# Patient Record
Sex: MALE | Race: WHITE | Employment: FULL TIME | URBAN - METROPOLITAN AREA
[De-identification: names, ages, dates, MRNs, and addresses within clinical notes are randomized per-mention and may not be internally consistent; named-entity substitution may affect disease eponyms.]

---

## 2017-08-19 ENCOUNTER — HOSPITAL ENCOUNTER (INPATIENT)
Age: 27
LOS: 2 days | Discharge: HOME | DRG: 638 | End: 2017-08-21
Attending: EMERGENCY MEDICINE | Admitting: INTERNAL MEDICINE
Payer: COMMERCIAL

## 2017-08-19 ENCOUNTER — APPOINTMENT (OUTPATIENT)
Dept: GENERAL RADIOLOGY | Age: 27
DRG: 638 | End: 2017-08-19
Attending: INTERNAL MEDICINE
Payer: COMMERCIAL

## 2017-08-19 DIAGNOSIS — N17.9 AKI (ACUTE KIDNEY INJURY) (HCC): ICD-10-CM

## 2017-08-19 DIAGNOSIS — E87.5 ACUTE HYPERKALEMIA: ICD-10-CM

## 2017-08-19 DIAGNOSIS — E10.10 DIABETIC KETOACIDOSIS WITHOUT COMA ASSOCIATED WITH TYPE 1 DIABETES MELLITUS (HCC): Primary | ICD-10-CM

## 2017-08-19 PROBLEM — E11.10 DKA (DIABETIC KETOACIDOSES): Status: ACTIVE | Noted: 2017-08-19

## 2017-08-19 LAB
ADMINISTERED INITIALS, ADMINIT: NORMAL
ALBUMIN SERPL-MCNC: 3.5 G/DL (ref 3.4–5)
ALBUMIN/GLOB SERPL: 0.9 {RATIO} (ref 0.8–1.7)
ALP SERPL-CCNC: 86 U/L (ref 45–117)
ALT SERPL-CCNC: 17 U/L (ref 16–61)
ANION GAP SERPL CALC-SCNC: 21 MMOL/L (ref 3–18)
ANION GAP SERPL CALC-SCNC: 23 MMOL/L (ref 3–18)
AST SERPL-CCNC: 16 U/L (ref 15–37)
BASOPHILS # BLD: 0 K/UL (ref 0–0.06)
BASOPHILS NFR BLD: 0 % (ref 0–2)
BILIRUB SERPL-MCNC: 1.1 MG/DL (ref 0.2–1)
BUN SERPL-MCNC: 31 MG/DL (ref 7–18)
BUN SERPL-MCNC: 32 MG/DL (ref 7–18)
BUN/CREAT SERPL: 16 (ref 12–20)
BUN/CREAT SERPL: 17 (ref 12–20)
CALCIUM SERPL-MCNC: 8.5 MG/DL (ref 8.5–10.1)
CALCIUM SERPL-MCNC: 9.6 MG/DL (ref 8.5–10.1)
CHLORIDE SERPL-SCNC: 91 MMOL/L (ref 100–108)
CHLORIDE SERPL-SCNC: 99 MMOL/L (ref 100–108)
CO2 SERPL-SCNC: 16 MMOL/L (ref 21–32)
CO2 SERPL-SCNC: 17 MMOL/L (ref 21–32)
CREAT SERPL-MCNC: 1.83 MG/DL (ref 0.6–1.3)
CREAT SERPL-MCNC: 2.01 MG/DL (ref 0.6–1.3)
D50 ADMINISTERED, D50ADM: 0 ML
D50 ORDER, D50ORD: 0 ML
DIFFERENTIAL METHOD BLD: ABNORMAL
EOSINOPHIL # BLD: 0 K/UL (ref 0–0.4)
EOSINOPHIL NFR BLD: 0 % (ref 0–5)
ERYTHROCYTE [DISTWIDTH] IN BLOOD BY AUTOMATED COUNT: 11.8 % (ref 11.6–14.5)
EST. AVERAGE GLUCOSE BLD GHB EST-MCNC: 206 MG/DL
GLOBULIN SER CALC-MCNC: 4 G/DL (ref 2–4)
GLUCOSE BLD STRIP.AUTO-MCNC: 345 MG/DL (ref 70–110)
GLUCOSE BLD STRIP.AUTO-MCNC: 439 MG/DL (ref 70–110)
GLUCOSE BLD STRIP.AUTO-MCNC: 519 MG/DL (ref 70–110)
GLUCOSE BLD STRIP.AUTO-MCNC: >600 MG/DL (ref 70–110)
GLUCOSE SERPL-MCNC: 466 MG/DL (ref 74–99)
GLUCOSE SERPL-MCNC: 707 MG/DL (ref 74–99)
GLUCOSE, GLC: 345 MG/DL
GLUCOSE, GLC: 439 MG/DL
GLUCOSE, GLC: 519 MG/DL
GLUCOSE, GLC: 707 MG/DL
HBA1C MFR BLD: 8.8 % (ref 4.5–5.6)
HCT VFR BLD AUTO: 44.6 % (ref 36–48)
HGB BLD-MCNC: 15.7 G/DL (ref 13–16)
HIGH TARGET, HITG: 180 MG/DL
INSULIN ADMINSTERED, INSADM: 12.9 UNITS/HOUR
INSULIN ADMINSTERED, INSADM: 2.9 UNITS/HOUR
INSULIN ADMINSTERED, INSADM: 4.6 UNITS/HOUR
INSULIN ADMINSTERED, INSADM: 7.6 UNITS/HOUR
INSULIN ORDER, INSORD: 12.9 UNITS/HOUR
INSULIN ORDER, INSORD: 2.9 UNITS/HOUR
INSULIN ORDER, INSORD: 4.6 UNITS/HOUR
INSULIN ORDER, INSORD: 7.6 UNITS/HOUR
LOW TARGET, LOT: 140 MG/DL
LYMPHOCYTES # BLD: 1.2 K/UL (ref 0.9–3.6)
LYMPHOCYTES NFR BLD: 11 % (ref 21–52)
MAGNESIUM SERPL-MCNC: 2.3 MG/DL (ref 1.6–2.6)
MCH RBC QN AUTO: 31.4 PG (ref 24–34)
MCHC RBC AUTO-ENTMCNC: 35.2 G/DL (ref 31–37)
MCV RBC AUTO: 89.2 FL (ref 74–97)
MINUTES UNTIL NEXT BG, NBG: 60 MIN
MONOCYTES # BLD: 0.5 K/UL (ref 0.05–1.2)
MONOCYTES NFR BLD: 5 % (ref 3–10)
MULTIPLIER, MUL: 0.01
MULTIPLIER, MUL: 0.01
MULTIPLIER, MUL: 0.02
MULTIPLIER, MUL: 0.02
NEUTS SEG # BLD: 9 K/UL (ref 1.8–8)
NEUTS SEG NFR BLD: 84 % (ref 40–73)
ORDER INITIALS, ORDINIT: NORMAL
PLATELET # BLD AUTO: 339 K/UL (ref 135–420)
PMV BLD AUTO: 11.1 FL (ref 9.2–11.8)
POTASSIUM SERPL-SCNC: 5.2 MMOL/L (ref 3.5–5.5)
POTASSIUM SERPL-SCNC: 6.5 MMOL/L (ref 3.5–5.5)
PROT SERPL-MCNC: 7.5 G/DL (ref 6.4–8.2)
RBC # BLD AUTO: 5 M/UL (ref 4.7–5.5)
RBC MORPH BLD: ABNORMAL
SODIUM SERPL-SCNC: 131 MMOL/L (ref 136–145)
SODIUM SERPL-SCNC: 136 MMOL/L (ref 136–145)
WBC # BLD AUTO: 10.7 K/UL (ref 4.6–13.2)

## 2017-08-19 PROCEDURE — 74011250636 HC RX REV CODE- 250/636: Performed by: PHYSICIAN ASSISTANT

## 2017-08-19 PROCEDURE — 82962 GLUCOSE BLOOD TEST: CPT

## 2017-08-19 PROCEDURE — 74011250636 HC RX REV CODE- 250/636: Performed by: INTERNAL MEDICINE

## 2017-08-19 PROCEDURE — 87040 BLOOD CULTURE FOR BACTERIA: CPT | Performed by: INTERNAL MEDICINE

## 2017-08-19 PROCEDURE — 80053 COMPREHEN METABOLIC PANEL: CPT | Performed by: INTERNAL MEDICINE

## 2017-08-19 PROCEDURE — 74011000258 HC RX REV CODE- 258: Performed by: PHYSICIAN ASSISTANT

## 2017-08-19 PROCEDURE — 83036 HEMOGLOBIN GLYCOSYLATED A1C: CPT | Performed by: PHYSICIAN ASSISTANT

## 2017-08-19 PROCEDURE — 93005 ELECTROCARDIOGRAM TRACING: CPT

## 2017-08-19 PROCEDURE — 96365 THER/PROPH/DIAG IV INF INIT: CPT

## 2017-08-19 PROCEDURE — 96361 HYDRATE IV INFUSION ADD-ON: CPT

## 2017-08-19 PROCEDURE — 80048 BASIC METABOLIC PNL TOTAL CA: CPT | Performed by: PHYSICIAN ASSISTANT

## 2017-08-19 PROCEDURE — 96375 TX/PRO/DX INJ NEW DRUG ADDON: CPT

## 2017-08-19 PROCEDURE — 99285 EMERGENCY DEPT VISIT HI MDM: CPT

## 2017-08-19 PROCEDURE — 74011000258 HC RX REV CODE- 258: Performed by: INTERNAL MEDICINE

## 2017-08-19 PROCEDURE — 83735 ASSAY OF MAGNESIUM: CPT | Performed by: PHYSICIAN ASSISTANT

## 2017-08-19 PROCEDURE — 71010 XR CHEST PORT: CPT

## 2017-08-19 PROCEDURE — 85025 COMPLETE CBC W/AUTO DIFF WBC: CPT | Performed by: PHYSICIAN ASSISTANT

## 2017-08-19 PROCEDURE — 36415 COLL VENOUS BLD VENIPUNCTURE: CPT | Performed by: INTERNAL MEDICINE

## 2017-08-19 PROCEDURE — 74011636637 HC RX REV CODE- 636/637: Performed by: PHYSICIAN ASSISTANT

## 2017-08-19 PROCEDURE — 65610000006 HC RM INTENSIVE CARE

## 2017-08-19 RX ORDER — MAGNESIUM SULFATE 100 %
4 CRYSTALS MISCELLANEOUS AS NEEDED
Status: DISCONTINUED | OUTPATIENT
Start: 2017-08-19 | End: 2017-08-19 | Stop reason: SDUPTHER

## 2017-08-19 RX ORDER — HEPARIN SODIUM 5000 [USP'U]/ML
5000 INJECTION, SOLUTION INTRAVENOUS; SUBCUTANEOUS EVERY 8 HOURS
Status: DISCONTINUED | OUTPATIENT
Start: 2017-08-19 | End: 2017-08-21 | Stop reason: HOSPADM

## 2017-08-19 RX ORDER — ACETAMINOPHEN 325 MG/1
650 TABLET ORAL
Status: DISCONTINUED | OUTPATIENT
Start: 2017-08-19 | End: 2017-08-21 | Stop reason: HOSPADM

## 2017-08-19 RX ORDER — MAGNESIUM SULFATE 100 %
4 CRYSTALS MISCELLANEOUS AS NEEDED
Status: DISCONTINUED | OUTPATIENT
Start: 2017-08-19 | End: 2017-08-21 | Stop reason: HOSPADM

## 2017-08-19 RX ORDER — ONDANSETRON 2 MG/ML
4 INJECTION INTRAMUSCULAR; INTRAVENOUS
Status: COMPLETED | OUTPATIENT
Start: 2017-08-19 | End: 2017-08-19

## 2017-08-19 RX ORDER — HYDROCODONE BITARTRATE AND ACETAMINOPHEN 5; 325 MG/1; MG/1
1 TABLET ORAL
Status: DISCONTINUED | OUTPATIENT
Start: 2017-08-19 | End: 2017-08-21 | Stop reason: HOSPADM

## 2017-08-19 RX ORDER — SODIUM CHLORIDE 9 MG/ML
200 INJECTION, SOLUTION INTRAVENOUS CONTINUOUS
Status: DISCONTINUED | OUTPATIENT
Start: 2017-08-19 | End: 2017-08-20

## 2017-08-19 RX ORDER — DEXTROSE 50 % IN WATER (D50W) INTRAVENOUS SYRINGE
25-50 AS NEEDED
Status: DISCONTINUED | OUTPATIENT
Start: 2017-08-19 | End: 2017-08-21 | Stop reason: HOSPADM

## 2017-08-19 RX ORDER — DEXTROSE 50 % IN WATER (D50W) INTRAVENOUS SYRINGE
25-50 AS NEEDED
Status: DISCONTINUED | OUTPATIENT
Start: 2017-08-19 | End: 2017-08-19 | Stop reason: SDUPTHER

## 2017-08-19 RX ORDER — BUPROPION HYDROCHLORIDE 100 MG/1
TABLET ORAL 2 TIMES DAILY
COMMUNITY

## 2017-08-19 RX ORDER — ENALAPRIL MALEATE 10 MG/1
TABLET ORAL DAILY
COMMUNITY

## 2017-08-19 RX ADMIN — SODIUM CHLORIDE 1000 ML: 900 INJECTION, SOLUTION INTRAVENOUS at 17:57

## 2017-08-19 RX ADMIN — SODIUM CHLORIDE 12.9 UNITS/HR: 900 INJECTION, SOLUTION INTRAVENOUS at 19:49

## 2017-08-19 RX ADMIN — PROMETHAZINE HYDROCHLORIDE 25 MG: 25 INJECTION, SOLUTION INTRAMUSCULAR; INTRAVENOUS at 20:18

## 2017-08-19 RX ADMIN — HEPARIN SODIUM 5000 UNITS: 5000 INJECTION, SOLUTION INTRAVENOUS; SUBCUTANEOUS at 22:20

## 2017-08-19 RX ADMIN — CEFTRIAXONE 1 G: 1 INJECTION, POWDER, FOR SOLUTION INTRAMUSCULAR; INTRAVENOUS at 22:19

## 2017-08-19 RX ADMIN — SODIUM CHLORIDE 125 ML/HR: 900 INJECTION, SOLUTION INTRAVENOUS at 22:19

## 2017-08-19 RX ADMIN — ONDANSETRON 4 MG: 2 INJECTION INTRAMUSCULAR; INTRAVENOUS at 18:00

## 2017-08-19 RX ADMIN — SODIUM CHLORIDE 1000 ML: 900 INJECTION, SOLUTION INTRAVENOUS at 17:56

## 2017-08-19 RX ADMIN — SODIUM CHLORIDE 4.6 UNITS/HR: 900 INJECTION, SOLUTION INTRAVENOUS at 21:02

## 2017-08-19 NOTE — Clinical Note
Status[de-identified] Inpatient [101] Type of Bed: Intensive Care [6] Inpatient Hospitalization Certified Necessary for the Following Reasons: 4. Patient requires ICU level of care interventions (further clarification in H&P documentation) Admitting Diagnosis: DKA (diabetic ketoacidoses) ([facility] [469644] Admitting Physician: [id number] [place] [address] Attending Physician: Leroy Serrano [id number] Estimated Length of Stay: 3-4 Midnights Discharge Plan[de-identified] Home with Office Follow-up

## 2017-08-19 NOTE — ED NOTES
Pt report given to Lenwood Nissen, RN. SBAR, ED summary, MAR and recent results reviewed. Pt resting in stretcher with side rails raised and call light within reach.

## 2017-08-19 NOTE — ED PROVIDER NOTES
[address]  EMERGENCY DEPARTMENT HISTORY AND PHYSICAL EXAM       Date: 8/19/2017   Patient Name: Sarah Gomez   YOB: 1990  Medical Record Number: [id number]    History of Presenting Illness     Chief Complaint   Patient presents with    High Blood Sugar    Nausea        History Provided By:  patient    Additional History: 5:26 PM  Sarah Gomez is a 31 y.o. male with PMHx of IDDM presenting to the ED C/O nausea and high blood pressure onset this morning. Associated sxs include vomiting (1 episode in ED) and fatigue. Pt reports he felt dehydrated this morning with a b/p of 221 and after drinking water without relief he visited [address] He states his b/p then elevated prompting visit to ED. Pt reports having strep throat last week and has been taking Amoxicillin. States his b/p was \"good\" until he got strep throat. Pt states during his \"normal day\" he measures his b/p at 11 PM, 3 AM and ~7 AM. States he eats dinner at 8 AM and then goes to sleep. Last insulin dose of 8 units prior to visit to [address] . Pt denies cough, generalized pain and any other symptoms or complaints at this time. Primary Care Provider: Maryjane Valdovinos MD   Specialist:    Past History     Past Medical History:   Past Medical History:   Diagnosis Date    Diabetes [facility])         Past Surgical History:   History reviewed. No pertinent surgical history. Family History:   History reviewed. No pertinent family history. Social History:   Social History   Substance Use Topics    Smoking status: Never Smoker    Smokeless tobacco: None    Alcohol use No        Allergies:   No Known Allergies     Review of Systems   Review of Systems   Constitutional: Positive for fatigue. Negative for chills and fever. Respiratory: Negative for cough. Gastrointestinal: Positive for nausea and vomiting. All other systems reviewed and are negative.       Physical Exam  Vitals:    08/19/17 1915 08/19/17 2030 08/19/17 2100 08/19/17 2115   BP:  108/46  116/48   Pulse: (!) 115 (!) 121 (!) 110 (!) 111   Resp: 11 14 18 17   Temp:    98.7 °F (37.1 °C)   SpO2: 100% 99% 100% 100%   Weight:       Height:           Physical Exam  Vital signs and nursing notes reviewed. CONSTITUTIONAL: Alert. Nontoxic-appearing; well-nourished; in no apparent distress. HEAD: Normocephalic; atraumatic. EYES: PERRL; Conjunctiva clear. ENT: TM's normal. External ear normal. Normal nose; no rhinorrhea. Normal pharynx. Moist mucus membranes. NECK: Supple; FROM without difficulty, non-tender; no cervical lymphadenopathy. CV: Tachycardic, regular S1, S2; no murmurs, rubs, or gallops. No chest wall tenderness. RESPIRATORY: Normal chest excursion with respiration; breath sounds clear and equal bilaterally; no wheezes, rhonchi, or rales. GI: Normal bowel sounds; non-distended; non-tender; No CVA tenderness. EXT: Normal ROM in all four extremities; non-tender to palpation. SKIN: Normal for age and race; warm; dry; good turgor; no apparent lesions or exudate. NEURO: A & O x3. Cranial nerves 2-12 intact. Motor 5/5 bilaterally. Sensation intact. PSYCH:  Mood and affect appropriate. Diagnostic Study Results     Labs -      Recent Results (from the past 12 hour(s))   CBC WITH AUTOMATED DIFF    Collection Time: 08/19/17  5:35 PM   Result Value Ref Range    WBC 10.7 4.6 - 13.2 K/uL    RBC 5.00 4.70 - 5.50 M/uL    HGB 15.7 13.0 - 16.0 g/dL    HCT 44.6 36.0 - 48.0 %    MCV 89.2 74.0 - 97.0 FL    MCH 31.4 24.0 - 34.0 PG    MCHC 35.2 31.0 - 37.0 g/dL    RDW 11.8 11.6 - 14.5 %    PLATELET 129 248 - 701 K/uL    MPV 11.1 9.2 - 11.8 FL    NEUTROPHILS 84 (H) 40 - 73 %    LYMPHOCYTES 11 (L) 21 - 52 %    MONOCYTES 5 3 - 10 %    EOSINOPHILS 0 0 - 5 %    BASOPHILS 0 0 - 2 %    ABS. NEUTROPHILS 9.0 (H) 1.8 - 8.0 K/UL    ABS. LYMPHOCYTES 1.2 0.9 - 3.6 K/UL    ABS. MONOCYTES 0.5 0.05 - 1.2 K/UL    ABS. EOSINOPHILS 0.0 0.0 - 0.4 K/UL    ABS. BASOPHILS 0.0 0.0 - 0.06 K/UL    RBC COMMENTS NORMOCYTIC, NORMOCHROMIC      DF AUTOMATED     METABOLIC PANEL, BASIC    Collection Time: 08/19/17  5:35 PM   Result Value Ref Range    Sodium 131 (L) 136 - 145 mmol/L    Potassium 6.5 (HH) 3.5 - 5.5 mmol/L    Chloride 91 (L) 100 - 108 mmol/L    CO2 17 (L) 21 - 32 mmol/L    Anion gap 23 (H) 3.0 - 18 mmol/L    Glucose 707 (HH) 74 - 99 mg/dL    BUN 32 (H) 7.0 - 18 MG/DL    Creatinine 2.01 (H) 0.6 - 1.3 MG/DL    BUN/Creatinine ratio 16 12 - 20      GFR est AA 49 (L) >60 ml/min/1.73m2    GFR est non-AA 40 (L) >60 ml/min/1.73m2    Calcium 9.6 8.5 - 10.1 MG/DL   MAGNESIUM    Collection Time: 08/19/17  5:35 PM   Result Value Ref Range    Magnesium 2.3 1.6 - 2.6 mg/dL   HEMOGLOBIN A1C WITH EAG    Collection Time: 08/19/17  5:35 PM   Result Value Ref Range    Hemoglobin A1c 8.8 (H) 4.5 - 5.6 %    Est. average glucose 206 mg/dL   GLUCOSTABILIZER    Collection Time: 08/19/17  7:46 PM   Result Value Ref Range    Glucose 707 mg/dL    Insulin order 12.9 units/hour    Insulin adminstered 12.9 units/hour    Multiplier 0.020     Low target 140 mg/dL    High target 180 mg/dL    D50 order 0.0 ml    D50 administered 0.00 ml    Minutes until next BG 60 min    Order initials MPV     Administered initials MPV    EKG, 12 LEAD, INITIAL    Collection Time: 08/19/17  8:00 PM   Result Value Ref Range    Ventricular Rate 116 BPM    Atrial Rate 116 BPM    P-R Interval 128 ms    QRS Duration 88 ms    Q-T Interval 316 ms    QTC Calculation (Bezet) 439 ms    Calculated P Axis 55 degrees    Calculated R Axis 86 degrees    Calculated T Axis 0 degrees    Diagnosis       Sinus tachycardia  Possible Left atrial enlargement  Borderline ECG  When compared with ECG of 15-JUL-2011 17:44,  No significant change was found     GLUCOSE, POC    Collection Time: 08/19/17  8:03 PM   Result Value Ref Range    Glucose (POC) >600 (HH) 70 - 110 mg/dL   GLUCOSE, POC    Collection Time: 08/19/17  8:53 PM   Result Value Ref Range    Glucose (POC) 530 (HH) 70 - 110 mg/dL   GLUCOSE, POC    Collection Time: 08/19/17  8:55 PM   Result Value Ref Range    Glucose (POC) 519 (HH) 70 - 110 mg/dL   GLUCOSTABILIZER    Collection Time: 08/19/17  9:00 PM   Result Value Ref Range    Glucose 519 mg/dL    Insulin order 4.6 units/hour    Insulin adminstered 4.6 units/hour    Multiplier 0.010     Low target 140 mg/dL    High target 180 mg/dL    D50 order 0.0 ml    D50 administered 0.00 ml    Minutes until next BG 60 min    Order initials MPV     Administered initials MPV        Radiologic Studies -  The following have been ordered and reviewed:   XR CHEST PORT    (Results Pending)       Medical Decision Making   I am the first provider for this patient. I reviewed the vital signs, available nursing notes, past medical history, past surgical history, family history and social history. Vital Signs-Reviewed the patient's vital signs. Patient Vitals for the past 12 hrs:   Temp Pulse Resp BP SpO2   08/19/17 2115 98.7 °F (37.1 °C) (!) 111 17 116/48 100 %   08/19/17 2100 - (!) 110 18 - 100 %   08/19/17 2030 - (!) 121 14 108/46 99 %   08/19/17 1915 - (!) 115 11 - 100 %   08/19/17 1900 - (!) 118 18 134/46 100 %   08/19/17 1845 - (!) 119 19 123/53 100 %   08/19/17 1830 - (!) 114 20 (!) 121/36 99 %   08/19/17 1815 - (!) 108 17 135/53 100 %   08/19/17 1800 - (!) 106 13 126/58 100 %   08/19/17 1745 - (!) 107 14 139/54 99 %   08/19/17 1730 - (!) 111 13 141/66 100 %   08/19/17 1724 98 °F (36.7 °C) (!) 107 16 136/59 100 %       Pulse Oximetry Analysis - Normal 100% on RA. Cardiac Monitor:   Rate: 118 bpm  Rhythm: Sinus Tachycardia     EKG interpretation: (Preliminary)  Rhythm: Sinus tachycardia. Rate (approx.): 116bpm; Possible left atrial enlargement   EKG read by Charlott Gitelman, PA-C at 8:00 PM    Old Medical Records: Nursing notes. Procedures:   Procedures      ED Course:  5:26 PM  Initial assessment performed.  The patients presenting problems have been discussed, and they are in agreement with the care plan formulated and outlined with them. I have encouraged them to ask questions as they arise throughout their visit. PROGRESS NOTE:   7:44 PM  Pt has been re-examined by RYAN Villasenor. Still nauseated but no longer vomiting and feeling better after 2L IV NS. Repeat FSBS after IVFs still \"high. \" Updated on results, dx of DKA and RENETTA requiring discontinuing his insulin pump, start glucostabilizer and admission to the ICU. He is tolerating PO fluids at this time. Written by RYAN Villasenor .    CONSULT NOTE:   7:45 PM  RYAN Villasenor spoke with Dr. Vinny Davis. Specialty: ED attending  Discussed pt's hx, disposition, and available diagnostic and imaging results. Reviewed care plans. Consulting physician agrees with plans as outlined, glucostabilizer and consulting hospitalist for admission. Written by RYAN Villasenor.    8:06 PM Discussed patient's history, exam, and available diagnostics results with Abisai Gutierrez MD, Hospitalist, who agrees to admit ICU. Medications Given in the ED:  Medications   insulin regular (NOVOLIN R, HUMULIN R) 100 Units in 0.9% sodium chloride 100 mL infusion (4.6 Units/hr IntraVENous New Bag 8/19/17 2102)   glucose chewable tablet 16 g (not administered)   glucagon (GLUCAGEN) injection 1 mg (not administered)   dextrose (D50W) injection syrg 12.5-25 g (not administered)   sodium chloride 0.9 % bolus infusion 1,000 mL (0 mL IntraVENous IV Completed 8/19/17 1951)   sodium chloride 0.9 % bolus infusion 1,000 mL (0 mL IntraVENous IV Completed 8/19/17 1951)   ondansetron (ZOFRAN) injection 4 mg (4 mg IntraVENous Given 8/19/17 1800)   promethazine (PHENERGAN) 25 mg in 0.9% sodium chloride 50 mL IVPB (25 mg IntraVENous New Bag 8/19/17 2018)     ADMISSION NOTE:  8:09 PM  Patient is being admitted to the hospital by Merline Sims.  Obey Gutierrez MD. The results of their tests and reasons for their admission have been discussed with them and/or available family. They convey agreement and understanding for the need to be admitted and for their admission diagnosis. Written by Yumi Nascimento ED Scribe, as dictated by Yohan Reid PA-C.    CONDITIONS ON ADMISSION:  Deep Vein Thrombosis is not present at the time of admission. Thrombosis is not present at the time of admission. Urinary Tract Infection is not present at the time of admission. Pneumonia is not present at the time of admission. MRSA is not present at the time of admission. Wound infection is not present at the time of admission. Pressure Ulcer is not present at the time of admission. Diagnosis   Clinical Impression:   1. Diabetic ketoacidosis without coma associated with type 1 diabetes mellitus ([facility]    2. RENETTA (acute kidney injury) ([facility]    3. Acute hyperkalemia       Critical Care Time:   I have spent 40 minutes of critical care time involved in lab review, consultations with specialist, family decision-making, and documentation. During this entire length of time I was immediately available to the patient. Critical Care: The reason for providing this level of medical care for this critically ill patient was due a critical illness that impaired one or more vital organ systems such that there was a high probability of imminent or life threatening deterioration in the patients condition. This care involved high complexity decision making to assess, manipulate, and support vital system functions, to treat this degreee vital organ system failure and to prevent further life threatening deterioration of the patients condition. _______________________________   Attestations: This note is prepared by Yumi Nascimento, acting as a Scribe for Yohan Reid PA-C on 5:21 PM on 8/19/2017 .     Yohan Reid PA-C: The scribe's documentation has been prepared under my direction and personally reviewed by me in its entirety.   _______________________________

## 2017-08-19 NOTE — ED NOTES
Pt resting in stretcher on CCM. IV fluids infusing. Pt medicated for nausea per MAR. Emesis bag given to pt. Large amount of emesis noted in basis. Pt denies vomiting before coming to ED. Family at bedside. Side rails raised x 2 and call light within reach.

## 2017-08-19 NOTE — IP AVS SNAPSHOT
Miah Zamudio 
 
 
 [address] [address] 
[phone] Patient: Patricia Steele MRN: [id number] :1990 You are allergic to the following No active allergies Recent Documentation Height Weight BMI Smoking Status 1.753 m 81.6 kg 26.58 kg/m2 Never Smoker Unresulted Labs Order Current Status T3 TOTAL In process CULTURE, BLOOD Preliminary result Emergency Contacts Name Discharge Info Relation Home Work Mobile Kecia Adjutant C  Parent [1] [phone] About your hospitalization You were admitted on:  2017 You last received care in the:  [facility] [address] You were discharged on:  2017 Unit phone number:  [phone] Why you were hospitalized Your primary diagnosis was:  Dka (Diabetic Ketoacidoses) (Hcc) Your diagnoses also included:  Dehydration, Iddm (Insulin Dependent Diabetes Mellitus) (Hcc), Lisandro (Acute Kidney Injury) (Hcc) Providers Seen During Your Hospitalizations Provider Role Specialty Primary office phone Charlie Guaman MD Attending Provider Emergency Medicine [phone] Pj Sanchez MD Attending Provider Emergency Medicine [phone] Matt Littlejohn MD Attending Provider Internal Medicine [phone] Your Primary Care Physician (PCP) Primary Care Physician Office Phone Office Fax [facility] [phone] Follow-up Information Follow up With Details Comments Contact Info King Minaya MD On 2017 Follow-up appointment @ 10:00 a.m. [address] [facility] [address] [place] [address] 
[phone] Current Discharge Medication List  
  
CONTINUE these medications which have NOT CHANGED Dose & Instructions Dispensing Information Comments Morning Noon Evening Bedtime  
 enalapril 10 mg tablet Commonly known as:  Joshua Salinas  
   
 Your last dose was: Your next dose is: Take  by mouth daily. Refills:  0 HumaLOG 100 unit/mL cartridge Generic drug:  insulin lispro Your last dose was: Your next dose is:    
   
   
 by SubCUTAneous route. Via insulin pump Refills:  0 WELLBUTRIN 100 mg tablet Generic drug:  buPROPion Your last dose was: Your next dose is: Take  by mouth two (2) times a day. Refills:  0 STOP taking these medications   
 insulin glargine 100 unit/mL injection Commonly known as:  LANTUS Discharge Instructions Learning About Diabetes Food Guidelines Your Care Instructions Meal planning is important to manage diabetes. It helps keep your blood sugar at a target level (which you set with your doctor). You don't have to eat special foods. You can eat what your family eats, including sweets once in a while. But you do have to pay attention to how often you eat and how much you eat of certain foods. You may want to work with a dietitian or a certified diabetes educator (CDE) to help you plan meals and snacks. A dietitian or CDE can also help you lose weight if that is one of your goals. What should you know about eating carbs? Managing the amount of carbohydrate (carbs) you eat is an important part of healthy meals when you have diabetes. Carbohydrate is found in many foods. · Learn which foods have carbs. And learn the amounts of carbs in different foods. ¨ Bread, cereal, pasta, and rice have about 15 grams of carbs in a serving. A serving is 1 slice of bread (1 ounce), ½ cup of cooked cereal, or 1/3 cup of cooked pasta or rice. ¨ Fruits have 15 grams of carbs in a serving.  A serving is 1 small fresh fruit, such as an apple or orange; ½ of a banana; ½ cup of cooked or canned fruit; ½ cup of fruit juice; 1 cup of melon or raspberries; or 2 tablespoons of dried fruit. ¨ Milk and no-sugar-added yogurt have 15 grams of carbs in a serving. A serving is 1 cup of milk or 2/3 cup of no-sugar-added yogurt. ¨ Starchy vegetables have 15 grams of carbs in a serving. A serving is ½ cup of mashed potatoes or sweet potato; 1 cup winter squash; ½ of a small baked potato; ½ cup of cooked beans; or ½ cup cooked corn or green peas. · Learn how much carbs to eat each day and at each meal. A dietitian or CDE can teach you how to keep track of the amount of carbs you eat. This is called carbohydrate counting. · If you are not sure how to count carbohydrate grams, use the Plate Method to plan meals. It is a good, quick way to make sure that you have a balanced meal. It also helps you spread carbs throughout the day. ¨ Divide your plate by types of foods. Put non-starchy vegetables on half the plate, meat or other protein food on one-quarter of the plate, and a grain or starchy vegetable in the final quarter of the plate. To this you can add a small piece of fruit and 1 cup of milk or yogurt, depending on how many carbs you are supposed to eat at a meal. 
· Try to eat about the same amount of carbs at each meal. Do not \"save up\" your daily allowance of carbs to eat at one meal. 
· Proteins have very little or no carbs per serving. Examples of proteins are beef, chicken, turkey, fish, eggs, tofu, cheese, cottage cheese, and peanut butter. A serving size of meat is 3 ounces, which is about the size of a deck of cards. Examples of meat substitute serving sizes (equal to 1 ounce of meat) are 1/4 cup of cottage cheese, 1 egg, 1 tablespoon of peanut butter, and ½ cup of tofu. How can you eat out and still eat healthy? · Learn to estimate the serving sizes of foods that have carbohydrate. If you measure food at home, it will be easier to estimate the amount in a serving of restaurant food.  
· If the meal you order has too much carbohydrate (such as potatoes, corn, or baked beans), ask to have a low-carbohydrate food instead. Ask for a salad or green vegetables. · If you use insulin, check your blood sugar before and after eating out to help you plan how much to eat in the future. · If you eat more carbohydrate at a meal than you had planned, take a walk or do other exercise. This will help lower your blood sugar. What else should you know? · Limit saturated fat, such as the fat from meat and dairy products. This is a healthy choice because people who have diabetes are at higher risk of heart disease. So choose lean cuts of meat and nonfat or low-fat dairy products. Use olive or canola oil instead of butter or shortening when cooking. · Don't skip meals. Your blood sugar may drop too low if you skip meals and take insulin or certain medicines for diabetes. · Check with your doctor before you drink alcohol. Alcohol can cause your blood sugar to drop too low. Alcohol can also cause a bad reaction if you take certain diabetes medicines. Follow-up care is a key part of your treatment and safety. Be sure to make and go to all appointments, and call your doctor if you are having problems. It's also a good idea to know your test results and keep a list of the medicines you take. Where can you learn more? Go to http://ronald-valentina.info/. Enter C045 in the search box to learn more about \"Learning About Diabetes Food Guidelines. \" Current as of: March 13, 2017 Content Version: 11.3 © 2878-5626 [employer]. Care instructions adapted under license by [employer] (which disclaims liability or warranty for this information). If you have questions about a medical condition or this instruction, always ask your healthcare professional. [place] [address] any warranty or liability for your use of this information. Nutrition Tips for Diabetes: After Your Visit Your Care Instructions A healthy diet is important to manage diabetes. It helps you lose weight (if you need to) and keep it off. It gives you the nutrition and energy your body needs and helps prevent heart disease. But a diet for diabetes does not mean that you have to eat special foods. You can eat what your family eats, including occasional sweets and other favorites. But you do have to pay attention to how often you eat and how much you eat of certain foods. The right plan for you will give you meals that help you keep your blood sugar at healthy levels. Try to eat a variety of foods and to spread carbohydrate throughout the day. Carbohydrate raises blood sugar higher and more quickly than any other nutrient does. Carbohydrate is found in sugar, breads and cereals, fruit, starchy vegetables such as potatoes and corn, and milk and yogurt. You may want to work with a dietitian or diabetes educator to help you plan meals and snacks. A dietitian or diabetes educator also can help you lose weight if that is one of your goals. The following tips can help you enjoy your meals and stay healthy. Follow-up care is a key part of your treatment and safety. Be sure to make and go to all appointments, and call your doctor if you are having problems. Its also a good idea to know your test results and keep a list of the medicines you take. How can you care for yourself at home? · Learn which foods have carbohydrate and how much carbohydrate to eat. A dietitian or diabetes educator can help you learn to keep track of how much carbohydrate you eat. · Spread carbohydrate throughout the day. Eat some carbohydrate at all meals, but do not eat too much at any one time. · Plan meals to include food from all the food groups. These are the food groups and some example portion sizes: ¨ Grains: 1 slice of bread (1 ounce), ½ cup of cooked cereal, and 1/3 cup of cooked pasta or rice.  These have about 15 grams of carbohydrate in a serving. Choose whole grains such as whole wheat bread or crackers, oatmeal, and brown rice more often than refined grains. ¨ Fruit: 1 small fresh fruit, such as an apple or orange; ½ of a banana; ½ cup of chopped, cooked, or canned fruit; ½ cup of fruit juice; 1 cup of melon or raspberries; and 2 tablespoons of dried fruit. These have about 15 grams of carbohydrate in a serving. ¨ Dairy: 1 cup of nonfat or low-fat milk and 2/3 cup of plain yogurt. These have about 15 grams of carbohydrate in a serving. ¨ Protein foods: Beef, chicken, turkey, fish, eggs, tofu, cheese, cottage cheese, and peanut butter. A serving size of meat is 3 ounces, which is about the size of a deck of cards. Examples of meat substitute serving sizes (equal to 1 ounce of meat) are 1/4 cup of cottage cheese, 1 egg, 1 tablespoon of peanut butter, and ½ cup of tofu. These have very little or no carbohydrate per serving. ¨ Vegetables: Starchy vegetables such as ½ cup of cooked dried beans, peas, potatoes, or corn have about 15 grams of carbohydrate. Nonstarchy vegetables have very little carbohydrate, such as 1 cup of raw leafy vegetables (such as spinach), ½ cup of other vegetables (cooked or chopped), and 3/4 cup of vegetable juice. · Use the plate format to plan meals. It is a good, quick way to make sure that you have a balanced meal. It also helps you spread carbohydrate throughout the day. You divide your plate by types of foods. Put vegetables on half the plate, meat or meat substitutes on one-quarter of the plate, and a grain or starchy vegetable (such as brown rice or a potato) in the final quarter of the plate. To this you can add a small piece of fruit and 1 cup of milk or yogurt, depending on how much carbohydrate you are supposed to eat at a meal. 
· Talk to your dietitian or diabetes educator about ways to add limited amounts of sweets into your meal plan.  You can eat these foods now and then, as long as you include the amount of carbohydrate they have in your daily carbohydrate allowance. · If you drink alcohol, limit it to no more than 1 drink a day for women and 2 drinks a day for men. If you are pregnant, no amount of alcohol is known to be safe. · Protein, fat, and fiber do not raise blood sugar as much as carbohydrate does. If you eat a lot of these nutrients in a meal, your blood sugar will rise more slowly than it would otherwise. · Limit saturated fats, such as those from meat and dairy products. Try to replace it with monounsaturated fat, such as olive oil. This is a healthier choice because people who have diabetes are at higher-than-average risk of heart disease. But use a modest amount of olive oil. A tablespoon of olive oil has 14 grams of fat and 120 calories. · Exercise lowers blood sugar. If you take insulin by shots or pump, you can use less than you would if you were not exercising. Keep in mind that timing matters. If you exercise within 1 hour after a meal, your body may need less insulin for that meal than it would if you exercised 3 hours after the meal. Test your blood sugar to find out how exercise affects your need for insulin. · Exercise on most days of the week. Aim for at least 30 minutes. Exercise helps you stay at a healthy weight and helps your body use insulin. Walking is an easy way to get exercise. Gradually increase the amount you walk every day. You also may want to swim, bike, or do other activities. When you eat out · Learn to estimate the serving sizes of foods that have carbohydrate. If you measure food at home, it will be easier to estimate the amount in a serving of restaurant food. · If the meal you order has too much carbohydrate (such as potatoes, corn, or baked beans), ask to have a low-carbohydrate food instead. Ask for a salad or green vegetables.  
· If you use insulin, check your blood sugar before and after eating out to help you plan how much to eat in the future. · If you eat more carbohydrate at a meal than you had planned, take a walk or do other exercise. This will help lower your blood sugar. Where can you learn more? Go to Savision.be Enter H471 in the search box to learn more about \"Nutrition Tips for Diabetes: After Your Visit. \"  
© 2803-2751 Healthwise, Incorporated. Care instructions adapted under license by Bethanie Sal (which disclaims liability or warranty for this information). This care instruction is for use with your licensed healthcare professional. If you have questions about a medical condition or this instruction, always ask your healthcare professional. [address] any warranty or liability for your use of this information. Content Version: 07.0.440798; Current as of: June 4, 2014 Discharge Orders None [facility] Announcement We are excited to announce that we are making your provider's discharge notes available to you in [facility]. You will see these notes when they are completed and signed by the physician that discharged you from your recent hospital stay. If you have any questions or concerns about any information you see in [facility], please call the Health Information Department where you were seen or reach out to your Primary Care Provider for more information about your plan of care. Introducing [facility]! Bethanie Sal introduces [facility] patient portal. Now you can access parts of your medical record, email your doctor's office, and request medication refills online. 1. In your internet browser, go to [email or web address] 2. Click on the First Time User? Click Here link in the Sign In box. You will see the New Member Sign Up page. 3. Enter your [facility] Access Code exactly as it appears below. You will not need to use this code after youve completed the sign-up process.  If you do not sign up before the expiration date, you must request a new code. · [facility] Access Code: [email or web address] Expires: 11/17/2017  5:03 PM 
 
4. Enter the last four digits of your Social Security Number (xxxx) and Date of Birth (mm/dd/yyyy) as indicated and click Submit. You will be taken to the next sign-up page. 5. Create a [facility] [employer] ID. This will be your [facility] [employer] login ID and cannot be changed, so think of one that is secure and easy to remember. 6. Create a [facility] [employer] password. You can change your password at any time. 7. Enter your Password Reset Question and Answer. This can be used at a later time if you forget your password. 8. Enter your e-mail address. You will receive e-mail notification when new information is available in [address] 9. Click Sign Up. You can now view and download portions of your medical record. 10. Click the Download Summary menu link to download a portable copy of your medical information. If you have questions, please visit the Frequently Asked Questions section of the [facility] [employer] website. Remember, [facility] [employer] is NOT to be used for urgent needs. For medical emergencies, dial 911. Now available from your iPhone and Android! General Information Please provide this summary of care documentation to your next provider. Patient Signature:  ____________________________________________________________ Date:  ____________________________________________________________  
  
Rexann Ports Provider Signature:  ____________________________________________________________ Date:  ____________________________________________________________

## 2017-08-19 NOTE — IP AVS SNAPSHOT
[place] [address] [address] 
[phone] Patient: Tami Oliveira MRN: [id number] :1990 Current Discharge Medication List  
  
CONTINUE these medications which have NOT CHANGED Dose & Instructions Dispensing Information Comments Morning Noon Evening Bedtime  
 enalapril 10 mg tablet Commonly known as:  Jose Mimi Your last dose was: Your next dose is: Take  by mouth daily. Refills:  0 HumaLOG 100 unit/mL cartridge Generic drug:  insulin lispro Your last dose was: Your next dose is:    
   
   
 by SubCUTAneous route. Via insulin pump Refills:  0 WELLBUTRIN 100 mg tablet Generic drug:  buPROPion Your last dose was: Your next dose is: Take  by mouth two (2) times a day. Refills:  0 STOP taking these medications   
 insulin glargine 100 unit/mL injection Commonly known as:  LANTUS

## 2017-08-20 ENCOUNTER — APPOINTMENT (OUTPATIENT)
Dept: GENERAL RADIOLOGY | Age: 27
DRG: 638 | End: 2017-08-20
Attending: FAMILY MEDICINE
Payer: COMMERCIAL

## 2017-08-20 PROBLEM — N17.9 AKI (ACUTE KIDNEY INJURY) (HCC): Status: ACTIVE | Noted: 2017-08-20

## 2017-08-20 LAB
ADMINISTERED INITIALS, ADMINIT: NORMAL
ALBUMIN SERPL-MCNC: 3.1 G/DL (ref 3.4–5)
ALBUMIN/GLOB SERPL: 0.9 {RATIO} (ref 0.8–1.7)
ALP SERPL-CCNC: 72 U/L (ref 45–117)
ALT SERPL-CCNC: 17 U/L (ref 16–61)
ANION GAP SERPL CALC-SCNC: 10 MMOL/L (ref 3–18)
ANION GAP SERPL CALC-SCNC: 11 MMOL/L (ref 3–18)
ANION GAP SERPL CALC-SCNC: 12 MMOL/L (ref 3–18)
ANION GAP SERPL CALC-SCNC: 8 MMOL/L (ref 3–18)
ANION GAP SERPL CALC-SCNC: 8 MMOL/L (ref 3–18)
APPEARANCE UR: CLEAR
AST SERPL-CCNC: 8 U/L (ref 15–37)
BASOPHILS # BLD: 0 K/UL (ref 0–0.06)
BASOPHILS NFR BLD: 0 % (ref 0–2)
BILIRUB SERPL-MCNC: 0.7 MG/DL (ref 0.2–1)
BILIRUB UR QL: NEGATIVE
BUN SERPL-MCNC: 12 MG/DL (ref 7–18)
BUN SERPL-MCNC: 12 MG/DL (ref 7–18)
BUN SERPL-MCNC: 14 MG/DL (ref 7–18)
BUN SERPL-MCNC: 17 MG/DL (ref 7–18)
BUN SERPL-MCNC: 23 MG/DL (ref 7–18)
BUN/CREAT SERPL: 11 (ref 12–20)
BUN/CREAT SERPL: 13 (ref 12–20)
BUN/CREAT SERPL: 16 (ref 12–20)
BUN/CREAT SERPL: 9 (ref 12–20)
BUN/CREAT SERPL: 9 (ref 12–20)
CALCIUM SERPL-MCNC: 7.7 MG/DL (ref 8.5–10.1)
CALCIUM SERPL-MCNC: 7.8 MG/DL (ref 8.5–10.1)
CALCIUM SERPL-MCNC: 7.8 MG/DL (ref 8.5–10.1)
CALCIUM SERPL-MCNC: 7.9 MG/DL (ref 8.5–10.1)
CALCIUM SERPL-MCNC: 8 MG/DL (ref 8.5–10.1)
CHLORIDE SERPL-SCNC: 102 MMOL/L (ref 100–108)
CHLORIDE SERPL-SCNC: 104 MMOL/L (ref 100–108)
CHLORIDE SERPL-SCNC: 105 MMOL/L (ref 100–108)
CHLORIDE SERPL-SCNC: 107 MMOL/L (ref 100–108)
CHLORIDE SERPL-SCNC: 107 MMOL/L (ref 100–108)
CO2 SERPL-SCNC: 22 MMOL/L (ref 21–32)
CO2 SERPL-SCNC: 23 MMOL/L (ref 21–32)
CO2 SERPL-SCNC: 25 MMOL/L (ref 21–32)
COLOR UR: YELLOW
CREAT SERPL-MCNC: 1.3 MG/DL (ref 0.6–1.3)
CREAT SERPL-MCNC: 1.31 MG/DL (ref 0.6–1.3)
CREAT SERPL-MCNC: 1.34 MG/DL (ref 0.6–1.3)
CREAT SERPL-MCNC: 1.35 MG/DL (ref 0.6–1.3)
CREAT SERPL-MCNC: 1.44 MG/DL (ref 0.6–1.3)
D50 ADMINISTERED, D50ADM: 0 ML
D50 ORDER, D50ORD: 0 ML
DIFFERENTIAL METHOD BLD: ABNORMAL
EOSINOPHIL # BLD: 0 K/UL (ref 0–0.4)
EOSINOPHIL NFR BLD: 0 % (ref 0–5)
ERYTHROCYTE [DISTWIDTH] IN BLOOD BY AUTOMATED COUNT: 11.7 % (ref 11.6–14.5)
GLOBULIN SER CALC-MCNC: 3.4 G/DL (ref 2–4)
GLUCOSE BLD STRIP.AUTO-MCNC: 128 MG/DL (ref 70–110)
GLUCOSE BLD STRIP.AUTO-MCNC: 134 MG/DL (ref 70–110)
GLUCOSE BLD STRIP.AUTO-MCNC: 134 MG/DL (ref 70–110)
GLUCOSE BLD STRIP.AUTO-MCNC: 137 MG/DL (ref 70–110)
GLUCOSE BLD STRIP.AUTO-MCNC: 144 MG/DL (ref 70–110)
GLUCOSE BLD STRIP.AUTO-MCNC: 148 MG/DL (ref 70–110)
GLUCOSE BLD STRIP.AUTO-MCNC: 150 MG/DL (ref 70–110)
GLUCOSE BLD STRIP.AUTO-MCNC: 156 MG/DL (ref 70–110)
GLUCOSE BLD STRIP.AUTO-MCNC: 165 MG/DL (ref 70–110)
GLUCOSE BLD STRIP.AUTO-MCNC: 169 MG/DL (ref 70–110)
GLUCOSE BLD STRIP.AUTO-MCNC: 173 MG/DL (ref 70–110)
GLUCOSE BLD STRIP.AUTO-MCNC: 184 MG/DL (ref 70–110)
GLUCOSE BLD STRIP.AUTO-MCNC: 193 MG/DL (ref 70–110)
GLUCOSE BLD STRIP.AUTO-MCNC: 194 MG/DL (ref 70–110)
GLUCOSE BLD STRIP.AUTO-MCNC: 213 MG/DL (ref 70–110)
GLUCOSE BLD STRIP.AUTO-MCNC: 215 MG/DL (ref 70–110)
GLUCOSE BLD STRIP.AUTO-MCNC: 230 MG/DL (ref 70–110)
GLUCOSE BLD STRIP.AUTO-MCNC: 234 MG/DL (ref 70–110)
GLUCOSE BLD STRIP.AUTO-MCNC: 240 MG/DL (ref 70–110)
GLUCOSE BLD STRIP.AUTO-MCNC: 261 MG/DL (ref 70–110)
GLUCOSE BLD STRIP.AUTO-MCNC: 348 MG/DL (ref 70–110)
GLUCOSE BLD STRIP.AUTO-MCNC: 530 MG/DL (ref 70–110)
GLUCOSE BLD STRIP.AUTO-MCNC: 99 MG/DL (ref 70–110)
GLUCOSE BLD STRIP.AUTO-MCNC: >600 MG/DL (ref 70–110)
GLUCOSE SERPL-MCNC: 139 MG/DL (ref 74–99)
GLUCOSE SERPL-MCNC: 139 MG/DL (ref 74–99)
GLUCOSE SERPL-MCNC: 194 MG/DL (ref 74–99)
GLUCOSE SERPL-MCNC: 199 MG/DL (ref 74–99)
GLUCOSE SERPL-MCNC: 240 MG/DL (ref 74–99)
GLUCOSE UR STRIP.AUTO-MCNC: >1000 MG/DL
GLUCOSE, GLC: 128 MG/DL
GLUCOSE, GLC: 134 MG/DL
GLUCOSE, GLC: 134 MG/DL
GLUCOSE, GLC: 137 MG/DL
GLUCOSE, GLC: 144 MG/DL
GLUCOSE, GLC: 148 MG/DL
GLUCOSE, GLC: 150 MG/DL
GLUCOSE, GLC: 156 MG/DL
GLUCOSE, GLC: 165 MG/DL
GLUCOSE, GLC: 169 MG/DL
GLUCOSE, GLC: 173 MG/DL
GLUCOSE, GLC: 184 MG/DL
GLUCOSE, GLC: 193 MG/DL
GLUCOSE, GLC: 194 MG/DL
GLUCOSE, GLC: 215 MG/DL
GLUCOSE, GLC: 216 MG/DL
GLUCOSE, GLC: 230 MG/DL
GLUCOSE, GLC: 234 MG/DL
GLUCOSE, GLC: 240 MG/DL
GLUCOSE, GLC: 261 MG/DL
GLUCOSE, GLC: 348 MG/DL
GLUCOSE, GLC: 99 MG/DL
HCT VFR BLD AUTO: 38 % (ref 36–48)
HGB BLD-MCNC: 13.4 G/DL (ref 13–16)
HGB UR QL STRIP: NEGATIVE
HIGH TARGET, HITG: 180 MG/DL
INSULIN ADMINSTERED, INSADM: 1.5 UNITS/HOUR
INSULIN ADMINSTERED, INSADM: 1.6 UNITS/HOUR
INSULIN ADMINSTERED, INSADM: 2 UNITS/HOUR
INSULIN ADMINSTERED, INSADM: 2 UNITS/HOUR
INSULIN ADMINSTERED, INSADM: 2.2 UNITS/HOUR
INSULIN ADMINSTERED, INSADM: 2.2 UNITS/HOUR
INSULIN ADMINSTERED, INSADM: 2.5 UNITS/HOUR
INSULIN ADMINSTERED, INSADM: 3.1 UNITS/HOUR
INSULIN ADMINSTERED, INSADM: 3.5 UNITS/HOUR
INSULIN ADMINSTERED, INSADM: 3.6 UNITS/HOUR
INSULIN ADMINSTERED, INSADM: 3.8 UNITS/HOUR
INSULIN ADMINSTERED, INSADM: 4 UNITS/HOUR
INSULIN ADMINSTERED, INSADM: 4.2 UNITS/HOUR
INSULIN ADMINSTERED, INSADM: 4.5 UNITS/HOUR
INSULIN ADMINSTERED, INSADM: 5 UNITS/HOUR
INSULIN ADMINSTERED, INSADM: 5.2 UNITS/HOUR
INSULIN ADMINSTERED, INSADM: 5.4 UNITS/HOUR
INSULIN ADMINSTERED, INSADM: 5.5 UNITS/HOUR
INSULIN ADMINSTERED, INSADM: 5.8 UNITS/HOUR
INSULIN ADMINSTERED, INSADM: 6.2 UNITS/HOUR
INSULIN ADMINSTERED, INSADM: 7.2 UNITS/HOUR
INSULIN ADMINSTERED, INSADM: 8.5 UNITS/HOUR
INSULIN ORDER, INSORD: 1.5 UNITS/HOUR
INSULIN ORDER, INSORD: 1.6 UNITS/HOUR
INSULIN ORDER, INSORD: 2 UNITS/HOUR
INSULIN ORDER, INSORD: 2 UNITS/HOUR
INSULIN ORDER, INSORD: 2.2 UNITS/HOUR
INSULIN ORDER, INSORD: 2.2 UNITS/HOUR
INSULIN ORDER, INSORD: 2.5 UNITS/HOUR
INSULIN ORDER, INSORD: 3.1 UNITS/HOUR
INSULIN ORDER, INSORD: 3.5 UNITS/HOUR
INSULIN ORDER, INSORD: 3.6 UNITS/HOUR
INSULIN ORDER, INSORD: 3.8 UNITS/HOUR
INSULIN ORDER, INSORD: 4 UNITS/HOUR
INSULIN ORDER, INSORD: 4.2 UNITS/HOUR
INSULIN ORDER, INSORD: 4.5 UNITS/HOUR
INSULIN ORDER, INSORD: 5 UNITS/HOUR
INSULIN ORDER, INSORD: 5.2 UNITS/HOUR
INSULIN ORDER, INSORD: 5.4 UNITS/HOUR
INSULIN ORDER, INSORD: 5.5 UNITS/HOUR
INSULIN ORDER, INSORD: 5.8 UNITS/HOUR
INSULIN ORDER, INSORD: 6.2 UNITS/HOUR
INSULIN ORDER, INSORD: 7.2 UNITS/HOUR
INSULIN ORDER, INSORD: 8.5 UNITS/HOUR
KETONES UR QL STRIP.AUTO: 80 MG/DL
LEUKOCYTE ESTERASE UR QL STRIP.AUTO: NEGATIVE
LOW TARGET, LOT: 140 MG/DL
LYMPHOCYTES # BLD: 2.9 K/UL (ref 0.9–3.6)
LYMPHOCYTES NFR BLD: 26 % (ref 21–52)
MAGNESIUM SERPL-MCNC: 2.2 MG/DL (ref 1.6–2.6)
MCH RBC QN AUTO: 30.5 PG (ref 24–34)
MCHC RBC AUTO-ENTMCNC: 35.3 G/DL (ref 31–37)
MCV RBC AUTO: 86.4 FL (ref 74–97)
MINUTES UNTIL NEXT BG, NBG: 60 MIN
MONOCYTES # BLD: 1 K/UL (ref 0.05–1.2)
MONOCYTES NFR BLD: 9 % (ref 3–10)
MULTIPLIER, MUL: 0.01
MULTIPLIER, MUL: 0.02
MULTIPLIER, MUL: 0.03
MULTIPLIER, MUL: 0.04
MULTIPLIER, MUL: 0.05
MULTIPLIER, MUL: 0.05
NEUTS SEG # BLD: 7.2 K/UL (ref 1.8–8)
NEUTS SEG NFR BLD: 65 % (ref 40–73)
NITRITE UR QL STRIP.AUTO: NEGATIVE
ORDER INITIALS, ORDINIT: NORMAL
PH UR STRIP: 5 [PH] (ref 5–8)
PHOSPHATE SERPL-MCNC: 2.9 MG/DL (ref 2.5–4.9)
PLATELET # BLD AUTO: 277 K/UL (ref 135–420)
PMV BLD AUTO: 9.9 FL (ref 9.2–11.8)
POTASSIUM SERPL-SCNC: 3.2 MMOL/L (ref 3.5–5.5)
POTASSIUM SERPL-SCNC: 3.5 MMOL/L (ref 3.5–5.5)
POTASSIUM SERPL-SCNC: 3.6 MMOL/L (ref 3.5–5.5)
POTASSIUM SERPL-SCNC: 3.8 MMOL/L (ref 3.5–5.5)
POTASSIUM SERPL-SCNC: 4 MMOL/L (ref 3.5–5.5)
PROT SERPL-MCNC: 6.5 G/DL (ref 6.4–8.2)
PROT UR STRIP-MCNC: NEGATIVE MG/DL
RBC # BLD AUTO: 4.4 M/UL (ref 4.7–5.5)
SODIUM SERPL-SCNC: 136 MMOL/L (ref 136–145)
SODIUM SERPL-SCNC: 138 MMOL/L (ref 136–145)
SODIUM SERPL-SCNC: 140 MMOL/L (ref 136–145)
SP GR UR REFRACTOMETRY: 1.02 (ref 1–1.03)
T4 FREE SERPL-MCNC: 0.8 NG/DL (ref 0.7–1.5)
TSH SERPL DL<=0.05 MIU/L-ACNC: 0.71 UIU/ML (ref 0.36–3.74)
UROBILINOGEN UR QL STRIP.AUTO: 0.2 EU/DL (ref 0.2–1)
WBC # BLD AUTO: 11.2 K/UL (ref 4.6–13.2)

## 2017-08-20 PROCEDURE — 84443 ASSAY THYROID STIM HORMONE: CPT | Performed by: INTERNAL MEDICINE

## 2017-08-20 PROCEDURE — 84439 ASSAY OF FREE THYROXINE: CPT | Performed by: INTERNAL MEDICINE

## 2017-08-20 PROCEDURE — 81003 URINALYSIS AUTO W/O SCOPE: CPT | Performed by: INTERNAL MEDICINE

## 2017-08-20 PROCEDURE — 85025 COMPLETE CBC W/AUTO DIFF WBC: CPT | Performed by: INTERNAL MEDICINE

## 2017-08-20 PROCEDURE — 74011250636 HC RX REV CODE- 250/636: Performed by: INTERNAL MEDICINE

## 2017-08-20 PROCEDURE — 74011250636 HC RX REV CODE- 250/636: Performed by: HOSPITALIST

## 2017-08-20 PROCEDURE — 65610000006 HC RM INTENSIVE CARE

## 2017-08-20 PROCEDURE — 83735 ASSAY OF MAGNESIUM: CPT | Performed by: INTERNAL MEDICINE

## 2017-08-20 PROCEDURE — 80053 COMPREHEN METABOLIC PANEL: CPT | Performed by: INTERNAL MEDICINE

## 2017-08-20 PROCEDURE — 80048 BASIC METABOLIC PNL TOTAL CA: CPT | Performed by: INTERNAL MEDICINE

## 2017-08-20 PROCEDURE — 74011636637 HC RX REV CODE- 636/637: Performed by: INTERNAL MEDICINE

## 2017-08-20 PROCEDURE — 74022 RADEX COMPL AQT ABD SERIES: CPT

## 2017-08-20 PROCEDURE — 74011000258 HC RX REV CODE- 258: Performed by: INTERNAL MEDICINE

## 2017-08-20 PROCEDURE — 84480 ASSAY TRIIODOTHYRONINE (T3): CPT | Performed by: INTERNAL MEDICINE

## 2017-08-20 PROCEDURE — 74011250637 HC RX REV CODE- 250/637: Performed by: INTERNAL MEDICINE

## 2017-08-20 PROCEDURE — 36415 COLL VENOUS BLD VENIPUNCTURE: CPT | Performed by: INTERNAL MEDICINE

## 2017-08-20 PROCEDURE — 84100 ASSAY OF PHOSPHORUS: CPT | Performed by: INTERNAL MEDICINE

## 2017-08-20 PROCEDURE — 82962 GLUCOSE BLOOD TEST: CPT

## 2017-08-20 RX ORDER — SODIUM CHLORIDE 9 MG/ML
500 INJECTION, SOLUTION INTRAVENOUS ONCE
Status: COMPLETED | OUTPATIENT
Start: 2017-08-20 | End: 2017-08-20

## 2017-08-20 RX ORDER — DEXTROSE, SODIUM CHLORIDE, AND POTASSIUM CHLORIDE 5; .45; .3 G/100ML; G/100ML; G/100ML
INJECTION INTRAVENOUS CONTINUOUS
Status: DISCONTINUED | OUTPATIENT
Start: 2017-08-20 | End: 2017-08-21 | Stop reason: HOSPADM

## 2017-08-20 RX ORDER — ONDANSETRON 2 MG/ML
4 INJECTION INTRAMUSCULAR; INTRAVENOUS
Status: DISCONTINUED | OUTPATIENT
Start: 2017-08-20 | End: 2017-08-21 | Stop reason: HOSPADM

## 2017-08-20 RX ORDER — DEXTROSE MONOHYDRATE AND SODIUM CHLORIDE 5; .45 G/100ML; G/100ML
200 INJECTION, SOLUTION INTRAVENOUS CONTINUOUS
Status: DISCONTINUED | OUTPATIENT
Start: 2017-08-20 | End: 2017-08-20

## 2017-08-20 RX ORDER — POTASSIUM CHLORIDE 7.45 MG/ML
10 INJECTION INTRAVENOUS
Status: COMPLETED | OUTPATIENT
Start: 2017-08-20 | End: 2017-08-21

## 2017-08-20 RX ADMIN — HEPARIN SODIUM 5000 UNITS: 5000 INJECTION, SOLUTION INTRAVENOUS; SUBCUTANEOUS at 23:13

## 2017-08-20 RX ADMIN — HYDROCODONE BITARTRATE AND ACETAMINOPHEN 1 TABLET: 5; 325 TABLET ORAL at 09:15

## 2017-08-20 RX ADMIN — DEXTROSE MONOHYDRATE AND SODIUM CHLORIDE 200 ML/HR: 5; .45 INJECTION, SOLUTION INTRAVENOUS at 12:22

## 2017-08-20 RX ADMIN — POTASSIUM CHLORIDE 10 MEQ: 10 INJECTION, SOLUTION INTRAVENOUS at 20:23

## 2017-08-20 RX ADMIN — DEXTROSE MONOHYDRATE AND SODIUM CHLORIDE 200 ML/HR: 5; .45 INJECTION, SOLUTION INTRAVENOUS at 02:34

## 2017-08-20 RX ADMIN — SODIUM CHLORIDE 4.5 UNITS/HR: 900 INJECTION, SOLUTION INTRAVENOUS at 05:51

## 2017-08-20 RX ADMIN — POTASSIUM CHLORIDE 10 MEQ: 10 INJECTION, SOLUTION INTRAVENOUS at 23:13

## 2017-08-20 RX ADMIN — DEXTROSE MONOHYDRATE, SODIUM CHLORIDE, AND POTASSIUM CHLORIDE: 50; 4.5; 2.98 INJECTION, SOLUTION INTRAVENOUS at 18:01

## 2017-08-20 RX ADMIN — HEPARIN SODIUM 5000 UNITS: 5000 INJECTION, SOLUTION INTRAVENOUS; SUBCUTANEOUS at 06:23

## 2017-08-20 RX ADMIN — HYDROCODONE BITARTRATE AND ACETAMINOPHEN 1 TABLET: 5; 325 TABLET ORAL at 13:20

## 2017-08-20 RX ADMIN — HEPARIN SODIUM 5000 UNITS: 5000 INJECTION, SOLUTION INTRAVENOUS; SUBCUTANEOUS at 16:18

## 2017-08-20 RX ADMIN — CEFTRIAXONE 1 G: 1 INJECTION, POWDER, FOR SOLUTION INTRAMUSCULAR; INTRAVENOUS at 23:13

## 2017-08-20 RX ADMIN — SODIUM CHLORIDE 500 ML: 900 INJECTION, SOLUTION INTRAVENOUS at 00:21

## 2017-08-20 RX ADMIN — POTASSIUM CHLORIDE 10 MEQ: 10 INJECTION, SOLUTION INTRAVENOUS at 21:56

## 2017-08-20 RX ADMIN — SODIUM CHLORIDE 3.6 UNITS/HR: 900 INJECTION, SOLUTION INTRAVENOUS at 07:34

## 2017-08-20 RX ADMIN — HYDROCODONE BITARTRATE AND ACETAMINOPHEN 1 TABLET: 5; 325 TABLET ORAL at 17:20

## 2017-08-20 NOTE — ROUTINE PROCESS
Bedside and Verbal shift change report given to Malorie Nath RN (oncoming nurse) by Sylvia Salas RN (offgoing nurse).  Report included the following information SBAR, Kardex, ED Summary, Intake/Output, MAR, Recent Results, Med Rec Status and Cardiac Rhythm SR-ST.

## 2017-08-20 NOTE — ROUTINE PROCESS
TRANSFER - OUT REPORT:    Verbal report given to Ewa Roper RN (name) on Kizzy Sanchez  being transferred to ICU (unit) for routine progression of care       Report consisted of patients Situation, Background, Assessment and   Recommendations(SBAR). Information from the following report(s) SBAR, Kardex, ED Summary, MAR, Recent Results and Cardiac Rhythm STach was reviewed with the receiving nurse. Lines:   Peripheral IV 08/19/17 Left Antecubital (Active)   Dressing Status Clean, dry, & intact 8/19/2017  5:35 PM   Dressing Type Transparent 8/19/2017  5:35 PM   Hub Color/Line Status Pink;Flushed 8/19/2017  5:35 PM   Action Taken Blood drawn 8/19/2017  5:35 PM        Opportunity for questions and clarification was provided.       Patient transported with:   Monitor  Registered Nurse  Tech

## 2017-08-20 NOTE — PROGRESS NOTES
Hospitalist Progress Note    Patient: Delma Coleman MRN: [id number]  CSN: [id number]    YOB: 1990  Age: 31 y.o. Sex: male    DOA: 8/19/2017 LOS:  LOS: 1 day                Assessment/Plan     Patient Active Problem List   Diagnosis Code    Dehydration E86.0    [facility]acidoses) [facility] E13.10    IDDM (insulin dependent diabetes mellitus) ([facility] E11.9, Z79.4    RENETTA (acute kidney injury) ([facility] N17.9            30 yo male with history of IDDM on insulin pump, admitted for DKA. Strep throat 1 week ago, was given amoxacillin. DKA - on glucose stabilizer, blood sugars now stable, start on diet. At home he is on insulin pump. Check UA for ketones. Transition to his insulin pump. Continue with generous IVF. Followed up KUB for findings of gas under left hemidiaphragm. No acute findings. RENETTA - secondary to dehydration. Follow renal function. Continue ceftriaxone. DVT prophylaxis with heparin. Review of systems  General: No fevers or chills. Cardiovascular: No chest pain or pressure. No palpitations. Pulmonary: No shortness of breath. Gastrointestinal: No nausea, vomiting. Physical Exam:  General: Awake, cooperative, no acute distress    HEENT: NC, Atraumatic. PERRLA, anicteric sclerae. Lungs: CTA Bilaterally. No Wheezing/Rhonchi/Rales. Heart:  Regular  rhythm,  No murmur, No Rubs, No Gallops  Abdomen: Soft, Non distended, Non tender.  +Bowel sounds,   Extremities: No c/c/e  Psych:   Not anxious or agitated. Neurologic:  No acute neurological deficit.                 Vital signs/Intake and Output:  Visit Vitals    BP (!) 107/33    Pulse (!) 101    Temp 98.4 °F (36.9 °C)    Resp 14    Ht 5' 9\" (1.753 m)    Wt 81.6 kg (180 lb)    SpO2 100%    BMI 26.58 kg/m2     Current Shift:  08/20 0701 - 08/20 1900  In: -   Out: 965 [Urine:875]  Last three shifts:  08/18 1901 - 08/20 0700  In: -   Out: 800 [Urine:800]            Labs: Results:       Chemistry Recent Labs      08/20/17   1020  08/20/17   0420  08/19/17   2220   GLU  194*  240*  466*   NA  138  136  136   K  3.6  4.0  5.2   CL  104  102  99*   CO2  22  23  16*   BUN  17  23*  31*   CREA  1.34*  1.44*  1.83*   CA  7.8*  7.8*  8.5   AGAP  12  11  21*   BUCR  13  16  17   AP   --   72  86   TP   --   6.5  7.5   ALB   --   3.1*  3.5   GLOB   --   3.4  4.0   AGRAT   --   0.9  0.9      CBC w/Diff Recent Labs      08/20/17   0420  08/19/17   1735   WBC  11.2  10.7   RBC  4.40*  5.00   HGB  13.4  15.7   HCT  38.0  44.6   PLT  277  339   GRANS  65  84*   LYMPH  26  11*   EOS  0  0      Cardiac Enzymes No results for input(s): CPK, CKND1, MARINA in the last 72 hours. No lab exists for component: CKRMB, TROIP   Coagulation No results for input(s): PTP, INR, APTT in the last 72 hours. No lab exists for component: INREXT, INREXT    Lipid Panel Lab Results   Component Value Date/Time    Cholesterol, total 135 07/17/2011 03:55 AM    HDL Cholesterol 24 07/17/2011 03:55 AM    LDL, calculated 61.6 07/17/2011 03:55 AM    VLDL, calculated 49.4 07/17/2011 03:55 AM    Triglyceride 247 07/17/2011 03:55 AM    CHOL/HDL Ratio 5.6 07/17/2011 03:55 AM      BNP No results for input(s): BNPP in the last 72 hours.    Liver Enzymes Recent Labs      08/20/17   0420   TP  6.5   ALB  3.1*   AP  72   SGOT  8*      Thyroid Studies Lab Results   Component Value Date/Time    TSH 4.17 07/17/2011 03:55 AM        Procedures/imaging: see electronic medical records for all procedures/Xrays and details which were not copied into this note but were reviewed prior to creation of Plan

## 2017-08-20 NOTE — PROGRESS NOTES
1350 hrs,new admission to unit from ED. Pt transferred from strecher to bed with minimial assistance. Insulin drip @ 4.6 units/hr and rate verified with transferring RN. Alert and orientated x4,afebrile. Mpnitoring in ST . BP stable . IV N/S running @ 125mls/hr. 0000 hrs no N/V pt sleeping between disturbances. 0015 hrs. BP 93/40 Dr Keron Mariano notified. Orders received for Bolus N/S 500mls  and increase maintenance to 200 mls/hr. When BSL reaches 250 change fluids to D5 1/2 N/S.    0220 hrs. Pt voided 800 mls. BP improved.  fluids changed per MAR.    0250 hrs pt complaining of nausea. Dr Keron Mariano contacted,zofran 4mg ordered. 0340 hrs. Reassessment completed. VSS ,pt sleeping. 0740 hrs Bedside and Verbal shift change report given to Roseann Fernando (oncoming nurse) by Margy Scott nurse). Report included the following information SBAR, Kardex, Intake/Output, MAR and Recent Results.

## 2017-08-20 NOTE — PROGRESS NOTES
6188 hrs,new admission to unit from ED. Pt transferred from strecher to bed with minimial assistance. Insulin drip @ 4.6 units/hr and rate verified with transferring RN. Alert and orientated x4,afebrile. Mpnitoring in ST . BP stable . IV N/S running @ 125mls/hr. 0000 hrs no N/V pt sleeping between disturbances. 0015 hrs. BP 93/40 Dr Lamine Monroy notified. Orders received for Bolus N/S 500mls  and increase maintenance to 200 mls/hr. When BSL reaches 250 change fluids to D5 1/2 N/S.

## 2017-08-20 NOTE — PROGRESS NOTES
**SHIFT SUMMARY**     Patient on Insulin gtt for entirety of my shift. Anion gap is closed as well as CO2 WNL. Per Dr. Arzola Florida would like to transition back to insulin pump tomorrow. Patient was given a diabetic diet and tolerated well. Patient had some acute abdominal pain today. Acute abd xray was obtained with negative findings. Pain was relieved with Norco 5-325mg 1 tab. Patient was medicated with this 3 times on my shift. Changed IVF over to D5-1/2NS c 40mEq KCL at 200ml for rapidly decreasing K+. BMP q4hrs per Insulin gtt protocol. Received orders to give KCL 10mEq x6 doses and place patient on electrolyte replacement protocol per Dr. Joseph Sharp for K+ of 3.2 at shift change.

## 2017-08-21 VITALS
OXYGEN SATURATION: 96 % | DIASTOLIC BLOOD PRESSURE: 65 MMHG | SYSTOLIC BLOOD PRESSURE: 119 MMHG | HEIGHT: 69 IN | HEART RATE: 100 BPM | BODY MASS INDEX: 26.66 KG/M2 | TEMPERATURE: 98.5 F | WEIGHT: 180 LBS | RESPIRATION RATE: 20 BRPM

## 2017-08-21 LAB
ADMINISTERED INITIALS, ADMINIT: NORMAL
ANION GAP SERPL CALC-SCNC: 6 MMOL/L (ref 3–18)
ANION GAP SERPL CALC-SCNC: 8 MMOL/L (ref 3–18)
ANION GAP SERPL CALC-SCNC: 9 MMOL/L (ref 3–18)
BUN SERPL-MCNC: 10 MG/DL (ref 7–18)
BUN SERPL-MCNC: 7 MG/DL (ref 7–18)
BUN SERPL-MCNC: 8 MG/DL (ref 7–18)
BUN/CREAT SERPL: 6 (ref 12–20)
BUN/CREAT SERPL: 7 (ref 12–20)
BUN/CREAT SERPL: 9 (ref 12–20)
CALCIUM SERPL-MCNC: 7.7 MG/DL (ref 8.5–10.1)
CALCIUM SERPL-MCNC: 7.9 MG/DL (ref 8.5–10.1)
CALCIUM SERPL-MCNC: 8.1 MG/DL (ref 8.5–10.1)
CHLORIDE SERPL-SCNC: 105 MMOL/L (ref 100–108)
CHLORIDE SERPL-SCNC: 107 MMOL/L (ref 100–108)
CHLORIDE SERPL-SCNC: 107 MMOL/L (ref 100–108)
CO2 SERPL-SCNC: 25 MMOL/L (ref 21–32)
CO2 SERPL-SCNC: 25 MMOL/L (ref 21–32)
CO2 SERPL-SCNC: 26 MMOL/L (ref 21–32)
CREAT SERPL-MCNC: 1.16 MG/DL (ref 0.6–1.3)
CREAT SERPL-MCNC: 1.2 MG/DL (ref 0.6–1.3)
CREAT SERPL-MCNC: 1.2 MG/DL (ref 0.6–1.3)
D50 ADMINISTERED, D50ADM: 0 ML
D50 ORDER, D50ORD: 0 ML
ERYTHROCYTE [DISTWIDTH] IN BLOOD BY AUTOMATED COUNT: 11.9 % (ref 11.6–14.5)
GLUCOSE BLD STRIP.AUTO-MCNC: 103 MG/DL (ref 70–110)
GLUCOSE BLD STRIP.AUTO-MCNC: 124 MG/DL (ref 70–110)
GLUCOSE BLD STRIP.AUTO-MCNC: 137 MG/DL (ref 70–110)
GLUCOSE BLD STRIP.AUTO-MCNC: 144 MG/DL (ref 70–110)
GLUCOSE BLD STRIP.AUTO-MCNC: 173 MG/DL (ref 70–110)
GLUCOSE BLD STRIP.AUTO-MCNC: 177 MG/DL (ref 70–110)
GLUCOSE BLD STRIP.AUTO-MCNC: 201 MG/DL (ref 70–110)
GLUCOSE BLD STRIP.AUTO-MCNC: 221 MG/DL (ref 70–110)
GLUCOSE BLD STRIP.AUTO-MCNC: 264 MG/DL (ref 70–110)
GLUCOSE BLD STRIP.AUTO-MCNC: 278 MG/DL (ref 70–110)
GLUCOSE BLD STRIP.AUTO-MCNC: 96 MG/DL (ref 70–110)
GLUCOSE SERPL-MCNC: 141 MG/DL (ref 74–99)
GLUCOSE SERPL-MCNC: 168 MG/DL (ref 74–99)
GLUCOSE SERPL-MCNC: 274 MG/DL (ref 74–99)
GLUCOSE, GLC: 103 MG/DL
GLUCOSE, GLC: 124 MG/DL
GLUCOSE, GLC: 137 MG/DL
GLUCOSE, GLC: 144 MG/DL
GLUCOSE, GLC: 173 MG/DL
GLUCOSE, GLC: 177 MG/DL
GLUCOSE, GLC: 201 MG/DL
GLUCOSE, GLC: 221 MG/DL
GLUCOSE, GLC: 264 MG/DL
GLUCOSE, GLC: 278 MG/DL
GLUCOSE, GLC: 96 MG/DL
HCT VFR BLD AUTO: 37.9 % (ref 36–48)
HGB BLD-MCNC: 13 G/DL (ref 13–16)
HIGH TARGET, HITG: 180 MG/DL
INSULIN ADMINSTERED, INSADM: 0.4 UNITS/HOUR
INSULIN ADMINSTERED, INSADM: 1.1 UNITS/HOUR
INSULIN ADMINSTERED, INSADM: 1.3 UNITS/HOUR
INSULIN ADMINSTERED, INSADM: 2.3 UNITS/HOUR
INSULIN ADMINSTERED, INSADM: 2.5 UNITS/HOUR
INSULIN ADMINSTERED, INSADM: 3.5 UNITS/HOUR
INSULIN ADMINSTERED, INSADM: 4.1 UNITS/HOUR
INSULIN ADMINSTERED, INSADM: 4.5 UNITS/HOUR
INSULIN ADMINSTERED, INSADM: 5.6 UNITS/HOUR
INSULIN ADMINSTERED, INSADM: 6.4 UNITS/HOUR
INSULIN ADMINSTERED, INSADM: 6.5 UNITS/HOUR
INSULIN ORDER, INSORD: 0.4 UNITS/HOUR
INSULIN ORDER, INSORD: 1.1 UNITS/HOUR
INSULIN ORDER, INSORD: 1.3 UNITS/HOUR
INSULIN ORDER, INSORD: 2.3 UNITS/HOUR
INSULIN ORDER, INSORD: 2.5 UNITS/HOUR
INSULIN ORDER, INSORD: 3.5 UNITS/HOUR
INSULIN ORDER, INSORD: 4.1 UNITS/HOUR
INSULIN ORDER, INSORD: 4.5 UNITS/HOUR
INSULIN ORDER, INSORD: 5.6 UNITS/HOUR
INSULIN ORDER, INSORD: 6.4 UNITS/HOUR
INSULIN ORDER, INSORD: 6.5 UNITS/HOUR
LOW TARGET, LOT: 140 MG/DL
MCH RBC QN AUTO: 30.2 PG (ref 24–34)
MCHC RBC AUTO-ENTMCNC: 34.3 G/DL (ref 31–37)
MCV RBC AUTO: 88.1 FL (ref 74–97)
MINUTES UNTIL NEXT BG, NBG: 60 MIN
MULTIPLIER, MUL: 0.01
MULTIPLIER, MUL: 0.02
MULTIPLIER, MUL: 0.02
MULTIPLIER, MUL: 0.03
MULTIPLIER, MUL: 0.04
ORDER INITIALS, ORDINIT: NORMAL
PLATELET # BLD AUTO: 246 K/UL (ref 135–420)
PMV BLD AUTO: 9.8 FL (ref 9.2–11.8)
POTASSIUM SERPL-SCNC: 3.9 MMOL/L (ref 3.5–5.5)
POTASSIUM SERPL-SCNC: 4 MMOL/L (ref 3.5–5.5)
POTASSIUM SERPL-SCNC: 4.1 MMOL/L (ref 3.5–5.5)
RBC # BLD AUTO: 4.3 M/UL (ref 4.7–5.5)
SODIUM SERPL-SCNC: 139 MMOL/L (ref 136–145)
SODIUM SERPL-SCNC: 139 MMOL/L (ref 136–145)
SODIUM SERPL-SCNC: 140 MMOL/L (ref 136–145)
WBC # BLD AUTO: 8.4 K/UL (ref 4.6–13.2)

## 2017-08-21 PROCEDURE — 85027 COMPLETE CBC AUTOMATED: CPT | Performed by: HOSPITALIST

## 2017-08-21 PROCEDURE — 74011250636 HC RX REV CODE- 250/636: Performed by: INTERNAL MEDICINE

## 2017-08-21 PROCEDURE — 36415 COLL VENOUS BLD VENIPUNCTURE: CPT | Performed by: INTERNAL MEDICINE

## 2017-08-21 PROCEDURE — 80048 BASIC METABOLIC PNL TOTAL CA: CPT | Performed by: HOSPITALIST

## 2017-08-21 PROCEDURE — 80048 BASIC METABOLIC PNL TOTAL CA: CPT | Performed by: INTERNAL MEDICINE

## 2017-08-21 PROCEDURE — 82962 GLUCOSE BLOOD TEST: CPT

## 2017-08-21 PROCEDURE — 74011250636 HC RX REV CODE- 250/636: Performed by: HOSPITALIST

## 2017-08-21 PROCEDURE — 74011636637 HC RX REV CODE- 636/637: Performed by: HOSPITALIST

## 2017-08-21 RX ORDER — INSULIN GLARGINE 100 [IU]/ML
20 INJECTION, SOLUTION SUBCUTANEOUS DAILY
Status: DISCONTINUED | OUTPATIENT
Start: 2017-08-21 | End: 2017-08-21 | Stop reason: HOSPADM

## 2017-08-21 RX ORDER — INSULIN LISPRO 100 [IU]/ML
3 INJECTION, SOLUTION INTRAVENOUS; SUBCUTANEOUS
Status: DISCONTINUED | OUTPATIENT
Start: 2017-08-21 | End: 2017-08-21 | Stop reason: HOSPADM

## 2017-08-21 RX ORDER — INSULIN LISPRO 100 [IU]/ML
INJECTION, SOLUTION INTRAVENOUS; SUBCUTANEOUS
Status: DISCONTINUED | OUTPATIENT
Start: 2017-08-21 | End: 2017-08-21 | Stop reason: HOSPADM

## 2017-08-21 RX ADMIN — DEXTROSE MONOHYDRATE, SODIUM CHLORIDE, AND POTASSIUM CHLORIDE: 50; 4.5; 2.98 INJECTION, SOLUTION INTRAVENOUS at 01:11

## 2017-08-21 RX ADMIN — DEXTROSE MONOHYDRATE, SODIUM CHLORIDE, AND POTASSIUM CHLORIDE 200 ML/HR: 50; 4.5; 2.98 INJECTION, SOLUTION INTRAVENOUS at 08:03

## 2017-08-21 RX ADMIN — INSULIN GLARGINE 20 UNITS: 100 INJECTION, SOLUTION SUBCUTANEOUS at 10:26

## 2017-08-21 RX ADMIN — POTASSIUM CHLORIDE 10 MEQ: 10 INJECTION, SOLUTION INTRAVENOUS at 01:10

## 2017-08-21 RX ADMIN — HEPARIN SODIUM 5000 UNITS: 5000 INJECTION, SOLUTION INTRAVENOUS; SUBCUTANEOUS at 08:04

## 2017-08-21 RX ADMIN — POTASSIUM CHLORIDE 10 MEQ: 10 INJECTION, SOLUTION INTRAVENOUS at 02:16

## 2017-08-21 RX ADMIN — POTASSIUM CHLORIDE 10 MEQ: 10 INJECTION, SOLUTION INTRAVENOUS at 00:43

## 2017-08-21 NOTE — PROGRESS NOTES
Patient discharged,will be returning back home and resuming previous medication no new medications,pt has follow up appointment with [facility] Management Interventions  PCP Verified by CM: Yes  Palliative Care Consult (Criteria: CHF and RRAT>21): No  Reason for No Palliative Care Consult: Other (see comment)  Mode of Transport at Discharge: Self  Transition of Care Consult (CM Consult): Other  Discharge Durable Medical Equipment: No  Health Maintenance Reviewed: Yes  Physical Therapy Consult: No  Occupational Therapy Consult: No  Speech Therapy Consult: No  Current Support Network:  Other  Confirm Follow Up Transport: Self  Plan discussed with Pt/Family/Caregiver: Yes  Freedom of Choice Offered:  (n/a)  Discharge Location  Discharge Placement: Home

## 2017-08-21 NOTE — PROGRESS NOTES
Problem: Falls - Risk of  Goal: *Absence of Falls  Document Eufemia Fall Risk and appropriate interventions in the flowsheet.    Outcome: Progressing Towards Goal  Fall Risk Interventions:              Medication Interventions: Evaluate medications/consider consulting pharmacy, Patient to call before getting OOB, Teach patient to arise slowly

## 2017-08-21 NOTE — PROGRESS NOTES
0715  Report received from MARVIN Frost RN. Assumed patient care. 0800 Shift assessment complete per flowsheet. Patient A&O x4, resting quietly in bed. VSS, NAD, patient denies pain at this time. Patient on Glucostabilizer, insulin infusing. 0920  Glycemic control at bedside. Dr. Robert Zabala paged, orders given to discontinue insulin drip per recommendations from glycemic control. 1200  Reassessment complete per flowsheet, VSS, NAD, patient denies pain at this time. 1230  Insulin drip d/c.      1510  Patient discharged home. Patient armband removed and shredded. All IVs removed. Patient took all personal belongings including cell phone, , and insulin pump.

## 2017-08-21 NOTE — PROGRESS NOTES
attempted an initial visit of the patient, who was sleeping. Spiritual Care materials left at bedside and board signed. Chaplains will continue to follow and will provide pastoral care as needed or requested. [address], M.Div.   Board Certified   [phone] - Office

## 2017-08-21 NOTE — PROGRESS NOTES
Shift report received from Naomi Grullon RN. Report included the following information SBAR, Kardex, Intake/Output, MAR, Recent Results and Cardiac Rhythm sinus tachycardia. Insulin gtt verified. 2000  Shift assessment completed per doc flow sheet. Patient aox4, lungs clear, 02 sats 100% on RA. Family at bedside. 2200  Resting quietly, NAD, Vss. Wants to go home. 0000  Reassessment completed w/no change in status. 0400  Reassessment w/no changed, NAD, VSS     Bedside and Verbal shift change report given to KAYLIE Chaney RN (oncoming nurse) by Abdoul Shields RN (offgoing nurse).  Report included the following information SBAR, Kardex, Intake/Output, MAR, Recent Results and Cardiac Rhythm SR.

## 2017-08-21 NOTE — DIABETES MGMT
GLYCEMIC CONTROL PROGRESS NOTE:  - consult received r/t DKA & pt on Glucostabilizer insulin drip, known h/o T1DM, X 17 years  -pt has Medtronic Insulin pump at bedside, does NOT have supplies to restart insulin pump  -per hospitalist progress note, will transition pt to basal/bolus sub-q  - need two CO2 wnl x 2 lab draws (met)  - need Anion gap <12 x 2 lab draws (met)  - need drip rate multiplier to be 0.02 and steady or decreasing x 4 hours (not met); pt eating  - need BG to within target range x 4 hours  (not met) pt eating     based on last 12 hours of insulin drip, pt required 41.8 units of insulin = 3.48 units/hr x 24 hours = 83.6 units/day (includes basal + food intake, Dextrose in fluids)  -per insulin drip wean report, estimated TDD is 34 units/day; basal bolus regimen 17 units basal + 5 units mealtime dose    -pt uses insulin pump at home, per pt report basal rate = 23.35 units/24 hours (12-5AM .975 units/hr; 5AM-12PM .925 units/hr; 12PM-12AM 1 unit/hr)    Recommendation for basal/bolus regimen   *Lantus 20 units daily   *Humalog 3 units with meals   *Humalog Normal Insulin Sensitivity Corrective Coverage  -Nurse on floor discussed with Dr. Genevieve Denver (nurse received telephone order for the above)  -DM education provided see note  -full GC consult to follow            Herb Perez RN, MS  Glycemic Control Team

## 2017-08-21 NOTE — ROUTINE PROCESS
Bedside and Verbal shift change report given to Katalina Montemayor RN (oncoming nurse) by Babak Chavis RN (offgoing nurse).  Report included the following information SBAR, Kardex, Intake/Output, MAR, Recent Results, Med Rec Status and Cardiac Rhythm SR.

## 2017-08-21 NOTE — DISCHARGE SUMMARY
Discharge Summary    Patient: Alana Jamil MRN: [id number]  CSN: [id number]    YOB: 1990  Age: 31 y.o. Sex: male    DOA: 8/19/2017 LOS:  LOS: 2 days   Discharge Date:      Primary Care Provider:  Yonatan Reyes MD    Admission Diagnoses: DKA (diabetic ketoacidoses) ([place] [address]  DKA (diabetic ketoacidoses) [facility])    Discharge Diagnoses:    Problem List as of 8/21/2017  Date Reviewed: 2/7/2015          Codes Class Noted - Resolved    IDDM (insulin dependent diabetes mellitus) ([place] [address] ICD-10-CM: E11.9, Z79.4  ICD-9-CM: 250.00, V58.67  8/20/2017 - Present        RENETTA (acute kidney injury) ([place] [address] ICD-10-CM: N17.9  ICD-9-CM: 584.9  8/20/2017 - Present        * (Principal)DKA (diabetic ketoacidoses) ([place] [address] ICD-10-CM: E13.10  ICD-9-CM: 250.10  8/19/2017 - Present        Dehydration ICD-10-CM: E86.0  ICD-9-CM: 276.51  2/7/2015 - Present              Discharge Medications:     Current Discharge Medication List      CONTINUE these medications which have NOT CHANGED    Details   insulin lispro (HUMALOG) 100 unit/mL cartridge by SubCUTAneous route. Via insulin pump      buPROPion (WELLBUTRIN) 100 mg tablet Take  by mouth two (2) times a day. enalapril (VASOTEC) 10 mg tablet Take  by mouth daily. STOP taking these medications       insulin glargine (LANTUS) 100 unit/mL injection Comments:   Reason for Stopping:               Discharge Condition: Good          PHYSICAL EXAM   Visit Vitals    /75    Pulse 91    Temp 98.5 °F (36.9 °C)    Resp 22    Ht 5' 9\" (1.753 m)    Wt 81.6 kg (180 lb)    SpO2 98%    BMI 26.58 kg/m2     General: Awake, cooperative, no acute distress    HEENT: NC, Atraumatic. PERRLA, EOMI. Anicteric sclerae. Lungs:  CTA Bilaterally. No Wheezing/Rhonchi/Rales. Heart:  Regular  rhythm,  No murmur, No Rubs, No Gallops  Abdomen: Soft, Non distended, Non tender. +Bowel sounds,   Extremities: No c/c/e  Psych:   Not anxious or agitated.   Neurologic:  No acute neurological deficits. Admission HPI :   Sukhi Duran is a 31 y.o. male being admitted to the hospital with DKA. Pt has been dx with strep throat last week and has been on abx. He has been having generlized weakness with fatigue and nausea. In ER, he has been dx wiith DKA along with dehydration. Hospital Course :   DKA - he was admitted to ICU and started on glucose stabilizer, his BMP checked initially every 4 hours. Once his blood sugars improved and he met parameters his insulin drip stopped and he was transitioned to basal and SSI. Wanted to transition to his insulin pump however he did not had supplies of his pump. At this point he can go home and start back on his insulin pump. He is cautioned about the pump rate as he has received lantus. For his recent strep throat, we continued antibiotics with ceftriaxone. Advised to complete his antibiotic course. RENETTA - pre renal, resolved with IVF. Follow up with PCP    Activity: Activity as tolerated    Diet: Diabetic Diet    Follow-up: PCP    Disposition: home    Minutes spent on discharge: 40       Labs: Results:       Chemistry Recent Labs      08/21/17   1013  08/21/17   0454  08/21/17   0149   08/20/17   0420  08/19/17   2220   GLU  274*  141*  168*   < >  240*  466*   NA  139  140  139   < >  136  136   K  4.1  3.9  4.0   < >  4.0  5.2   CL  105  107  107   < >  102  99*   CO2  25  25  26   < >  23  16*   BUN  7  8  10   < >  23*  31*   CREA  1.20  1.20  1.16   < >  1.44*  1.83*   CA  8.1*  7.9*  7.7*   < >  7.8*  8.5   AGAP  9  8  6   < >  11  21*   BUCR  6*  7*  9*   < >  16  17   AP   --    --    --    --   72  86   TP   --    --    --    --   6.5  7.5   ALB   --    --    --    --   3.1*  3.5   GLOB   --    --    --    --   3.4  4.0   AGRAT   --    --    --    --   0.9  0.9    < > = values in this interval not displayed.       CBC w/Diff Recent Labs      08/21/17   0454  08/20/17   0420  08/19/17   1735   WBC  8.4  11.2 10.7   RBC  4.30*  4.40*  5.00   HGB  13.0  13.4  15.7   HCT  37.9  38.0  44.6   PLT  246  277  339   GRANS   --   65  84*   LYMPH   --   26  11*   EOS   --   0  0      Cardiac Enzymes No results for input(s): CPK, CKND1, MARINA in the last 72 hours. No lab exists for component: CKRMB, TROIP   Coagulation No results for input(s): PTP, INR, APTT in the last 72 hours. No lab exists for component: INREXT    Lipid Panel Lab Results   Component Value Date/Time    Cholesterol, total 135 07/17/2011 03:55 AM    HDL Cholesterol 24 07/17/2011 03:55 AM    LDL, calculated 61.6 07/17/2011 03:55 AM    VLDL, calculated 49.4 07/17/2011 03:55 AM    Triglyceride 247 07/17/2011 03:55 AM    CHOL/HDL Ratio 5.6 07/17/2011 03:55 AM      BNP No results for input(s): BNPP in the last 72 hours. Liver Enzymes Recent Labs      08/20/17   0420   TP  6.5   ALB  3.1*   AP  72   SGOT  8*      Thyroid Studies Lab Results   Component Value Date/Time    TSH 0.71 08/20/2017 04:20 AM            Significant Diagnostic Studies: Xr Abd Acute W 1 V Chest    Result Date: 8/20/2017  EXAM: Portable upright chest, one view INDICATION: Diabetic ketoacidosis. COMPARISON: 8/19/2017 _______________ FINDINGS: Cardiac silhouette and pulmonary vascularity are normal.  Lungs are clear. Costophrenic angles are sharp. No pneumothorax. Previous gas below the left hemidiaphragm is no longer visible. _______________     IMPRESSION: No active disease. Xr Chest Port    Result Date: 8/19/2017  EXAM:  XR CHEST PORT INDICATION:  cough COMPARISON:  2/6/2015 FINDINGS: A portable AP radiograph of the chest was obtained at 2056 hours. The patient is on a cardiac monitor. The lungs are clear. The cardiac and mediastinal contours and pulmonary vascularity are normal.  No pleural effusion or pneumothorax. No evidence of acute osseous abnormality. There is gas below the left hemidiaphragm. IMPRESSION: 1. No radiographic evidence of acute cardiopulmonary abnormality.  2. Gas below the left hemidiaphragm may be within the stomach, particularly given that no gas is seen low the right hemidiaphragm. . If there is concern for free intra-abdominal air then would recommend upright PA and lateral views of the chest or abdominal CT for further evaluation. No results found for this or any previous visit.         CC: Umberto Araujo MD

## 2017-08-21 NOTE — INTERDISCIPLINARY ROUNDS
CRITICAL CARE INTERDISCIPLINARY ROUNDS    Patient Information:    Name:   Lashae Ca    Age:   31 y.o. Admission Date:   8/19/2017    Critical Care Day:  2    Surgery Date:      Attending Provider:   Sami Montes De Oca MD    Surgeon:        Consultant(s):   cheri    Critical Care Physician:  Dr Jhonny Scott - not on case    Code Status: Full Code    Problem List:     Patient Active Problem List   Diagnosis Code    Dehydration E86.0    DKA (diabetic ketoacidoses) ([facility] [address] E13.10    IDDM (insulin dependent diabetes mellitus) ([facility] [address] E11.9, Z79.4    RNEETTA (acute kidney injury) ([facility] [address] N17.9       Principal Problem:  DKA (diabetic ketoacidoses) ([facility] [address]    During rounds the following quality care indicators and evidence based practices were addressed :  DVT Prophylaxis and PUD Prophylaxis [place] Day na ([facility] na) ; Central Line Day:na  Isolation:na; Antibiotic Stewardship: reviewed; Probiotics Necessary: na    Ventilator Bundle:  na    Sepsis Order Set:    Glycemic Control:   Recent Labs      08/21/17   0454  08/21/17   0149  08/20/17   2200  08/20/17   1810   GLU  141*  168*  199*  139*   ; No results for input(s): PHI, PCO2I, PO2I in the last 72 hours. No lab exists for component: 3 Adjustments     Acute MI/PCI:   Not applicable    Door to Balloon: Admission Time: 1715      Heart Failure:    Not applicable     SCIP Measures for other Surgeries:   Not applicable     Pneumonia:    Not applicable    Interdisciplinary team rounds were held with the following team membersCare Management, Nursing, Nutrition, Pastoral Care, Pharmacy, Physician, Respiratory Therapy and Speech Therapy. Plan of care discussed. Goals of Care/ Recommendations: transition to sub q    See clinical pathway and/or care plan for interventions and desired outcomes.     Critical Care Discharge Status:  yellow

## 2017-08-21 NOTE — DIABETES MGMT
Diabetes Patient/Family Education Record  -pt with known h/o T1DM, X 17 years Medtronic 630G home insulin pump with CGM, follow by Dr. Megan Salcedo Endocrinologist  -pt has been on pump therapy X 1 year, (see progress note for basal rates)  -per pt report CGM has to charge every 7 days, & was unable to use because of this when DKA occurred; pt has working glucometer for calibration but states was not checking as frequently during illness, at [facility] BG > 700 mg/dL, previous day BG in 200s, CGM alert set for < 70 mg/dL > 175 mg/dL  -pt reports last HbA1C > 9%  -pt educated on the need to reconnect to CGM upon discharge monitor BG for the need for mealtime & corrective coverage only; pt has received Lantus basal insulin 20 units this morning  -pt verbalized he will only bolus based on BG readings & will not restart basal rate  -pt able to competently walk this writer through all functions of Medtronic insulin pump without any difficulty including basal rates, bolus wizard, & CGM functions      Recent Glucose Results:   Lab Results   Component Value Date/Time     (H) 08/21/2017 10:13 AM     (H) 08/21/2017 04:54 AM     (H) 08/21/2017 01:49 AM    GLUCPOC 173 (H) 08/21/2017 11:47 AM    GLUCPOC 221 (H) 08/21/2017 10:41 AM    GLUCPOC 278 (H) 08/21/2017 09:36 AM       Factors That  May Influence Patients Ability  to Learn or  Comply With  Recommendations:    []   Language barrier    []   Cultural needs   []   Motivation    []   Cognitive limitation    []   Physical   []   Education    []   Physiological factors   []   Hearing/vision/speaking impairment   []   Catholic beliefs    []   Financial factors   []  Other:   [x]  No factors identified at this time.      Person Instructed:   [x]   Patient   []   Family   []  Other     Preference for Learning:   [x]   Verbal   []   Written   []  Demonstration     Level of Comprehension & Competence:    [x]  Good                                      [] Fair []  Poor                             []  Needs Reinforcement   [x]  Teachback completed    Education Component:   [x]  Medication management, including Medtronic insulin pump  (humalog )basal rates, CHO ratio, & sensitivity factor    [x]  Nutritional management including the role of carbohydrate intake   []  Exercise   []  Signs, symptoms, and treatment of hyperglycemia and hypoglycemia   [] Treatment of hyperglycemia and hypoglycemia   [x]  Importance of blood glucose monitoring; pt has functioning CGM & glucometer for calibration   []  Instruction on use of blood glucose meter   [x]  Discuss the importance of HbA1C monitoring and provide patient with  results   []  Sick day guidelines   []  Proper use and disposal of lancets, needles, syringes or insulin pens (if appropriate)   []  Potential long-term complications (retinopathy, kidney disease, neuropathy, heart disease, stroke, vascular disease, foot care)   [] Provide emergency contact number and contact number for more information    [x]  Goal:  Patient/family will demonstrate understanding of Diabetes Self Management Skills by: (date) 9/30  Plan for post-discharge education or self-management support:    [] Outpatient class schedule provided            [] Patient Declined    [] Scheduled for outpatient classes (date) _______         Donnella Ahumada RN, MS  Glycemic Control Team

## 2017-08-21 NOTE — DISCHARGE INSTRUCTIONS
Learning About Diabetes Food Guidelines  Your Care Instructions  Meal planning is important to manage diabetes. It helps keep your blood sugar at a target level (which you set with your doctor). You don't have to eat special foods. You can eat what your family eats, including sweets once in a while. But you do have to pay attention to how often you eat and how much you eat of certain foods. You may want to work with a dietitian or a certified diabetes educator (CDE) to help you plan meals and snacks. A dietitian or CDE can also help you lose weight if that is one of your goals. What should you know about eating carbs? Managing the amount of carbohydrate (carbs) you eat is an important part of healthy meals when you have diabetes. Carbohydrate is found in many foods. · Learn which foods have carbs. And learn the amounts of carbs in different foods. ¨ Bread, cereal, pasta, and rice have about 15 grams of carbs in a serving. A serving is 1 slice of bread (1 ounce), ½ cup of cooked cereal, or 1/3 cup of cooked pasta or rice. ¨ Fruits have 15 grams of carbs in a serving. A serving is 1 small fresh fruit, such as an apple or orange; ½ of a banana; ½ cup of cooked or canned fruit; ½ cup of fruit juice; 1 cup of melon or raspberries; or 2 tablespoons of dried fruit. ¨ Milk and no-sugar-added yogurt have 15 grams of carbs in a serving. A serving is 1 cup of milk or 2/3 cup of no-sugar-added yogurt. ¨ Starchy vegetables have 15 grams of carbs in a serving. A serving is ½ cup of mashed potatoes or sweet potato; 1 cup winter squash; ½ of a small baked potato; ½ cup of cooked beans; or ½ cup cooked corn or green peas. · Learn how much carbs to eat each day and at each meal. A dietitian or CDE can teach you how to keep track of the amount of carbs you eat. This is called carbohydrate counting. · If you are not sure how to count carbohydrate grams, use the Plate Method to plan meals.  It is a good, quick way to make sure that you have a balanced meal. It also helps you spread carbs throughout the day. ¨ Divide your plate by types of foods. Put non-starchy vegetables on half the plate, meat or other protein food on one-quarter of the plate, and a grain or starchy vegetable in the final quarter of the plate. To this you can add a small piece of fruit and 1 cup of milk or yogurt, depending on how many carbs you are supposed to eat at a meal.  · Try to eat about the same amount of carbs at each meal. Do not \"save up\" your daily allowance of carbs to eat at one meal.  · Proteins have very little or no carbs per serving. Examples of proteins are beef, chicken, turkey, fish, eggs, tofu, cheese, cottage cheese, and peanut butter. A serving size of meat is 3 ounces, which is about the size of a deck of cards. Examples of meat substitute serving sizes (equal to 1 ounce of meat) are 1/4 cup of cottage cheese, 1 egg, 1 tablespoon of peanut butter, and ½ cup of tofu. How can you eat out and still eat healthy? · Learn to estimate the serving sizes of foods that have carbohydrate. If you measure food at home, it will be easier to estimate the amount in a serving of restaurant food. · If the meal you order has too much carbohydrate (such as potatoes, corn, or baked beans), ask to have a low-carbohydrate food instead. Ask for a salad or green vegetables. · If you use insulin, check your blood sugar before and after eating out to help you plan how much to eat in the future. · If you eat more carbohydrate at a meal than you had planned, take a walk or do other exercise. This will help lower your blood sugar. What else should you know? · Limit saturated fat, such as the fat from meat and dairy products. This is a healthy choice because people who have diabetes are at higher risk of heart disease. So choose lean cuts of meat and nonfat or low-fat dairy products.  Use olive or canola oil instead of butter or shortening when cooking. · Don't skip meals. Your blood sugar may drop too low if you skip meals and take insulin or certain medicines for diabetes. · Check with your doctor before you drink alcohol. Alcohol can cause your blood sugar to drop too low. Alcohol can also cause a bad reaction if you take certain diabetes medicines. Follow-up care is a key part of your treatment and safety. Be sure to make and go to all appointments, and call your doctor if you are having problems. It's also a good idea to know your test results and keep a list of the medicines you take. Where can you learn more? Go to http://ronald-valentina.info/. Enter F446 in the search box to learn more about \"Learning About Diabetes Food Guidelines. \"  Current as of: March 13, 2017  Content Version: 11.3  © 6304-5098 [employer]. Care instructions adapted under license by [employer] (which disclaims liability or warranty for this information). If you have questions about a medical condition or this instruction, always ask your healthcare professional. [place] [address] any warranty or liability for your use of this information. Nutrition Tips for Diabetes: After Your Visit  Your Care Instructions  A healthy diet is important to manage diabetes. It helps you lose weight (if you need to) and keep it off. It gives you the nutrition and energy your body needs and helps prevent heart disease. But a diet for diabetes does not mean that you have to eat special foods. You can eat what your family eats, including occasional sweets and other favorites. But you do have to pay attention to how often you eat and how much you eat of certain foods. The right plan for you will give you meals that help you keep your blood sugar at healthy levels. Try to eat a variety of foods and to spread carbohydrate throughout the day. Carbohydrate raises blood sugar higher and more quickly than any other nutrient does. Carbohydrate is found in sugar, breads and cereals, fruit, starchy vegetables such as potatoes and corn, and milk and yogurt. You may want to work with a dietitian or diabetes educator to help you plan meals and snacks. A dietitian or diabetes educator also can help you lose weight if that is one of your goals. The following tips can help you enjoy your meals and stay healthy. Follow-up care is a key part of your treatment and safety. Be sure to make and go to all appointments, and call your doctor if you are having problems. Its also a good idea to know your test results and keep a list of the medicines you take. How can you care for yourself at home? · Learn which foods have carbohydrate and how much carbohydrate to eat. A dietitian or diabetes educator can help you learn to keep track of how much carbohydrate you eat. · Spread carbohydrate throughout the day. Eat some carbohydrate at all meals, but do not eat too much at any one time. · Plan meals to include food from all the food groups. These are the food groups and some example portion sizes:  ¨ Grains: 1 slice of bread (1 ounce), ½ cup of cooked cereal, and 1/3 cup of cooked pasta or rice. These have about 15 grams of carbohydrate in a serving. Choose whole grains such as whole wheat bread or crackers, oatmeal, and brown rice more often than refined grains. ¨ Fruit: 1 small fresh fruit, such as an apple or orange; ½ of a banana; ½ cup of chopped, cooked, or canned fruit; ½ cup of fruit juice; 1 cup of melon or raspberries; and 2 tablespoons of dried fruit. These have about 15 grams of carbohydrate in a serving. ¨ Dairy: 1 cup of nonfat or low-fat milk and 2/3 cup of plain yogurt. These have about 15 grams of carbohydrate in a serving. ¨ Protein foods: Beef, chicken, turkey, fish, eggs, tofu, cheese, cottage cheese, and peanut butter. A serving size of meat is 3 ounces, which is about the size of a deck of cards.  Examples of meat substitute serving sizes (equal to 1 ounce of meat) are 1/4 cup of cottage cheese, 1 egg, 1 tablespoon of peanut butter, and ½ cup of tofu. These have very little or no carbohydrate per serving. ¨ Vegetables: Starchy vegetables such as ½ cup of cooked dried beans, peas, potatoes, or corn have about 15 grams of carbohydrate. Nonstarchy vegetables have very little carbohydrate, such as 1 cup of raw leafy vegetables (such as spinach), ½ cup of other vegetables (cooked or chopped), and 3/4 cup of vegetable juice. · Use the plate format to plan meals. It is a good, quick way to make sure that you have a balanced meal. It also helps you spread carbohydrate throughout the day. You divide your plate by types of foods. Put vegetables on half the plate, meat or meat substitutes on one-quarter of the plate, and a grain or starchy vegetable (such as brown rice or a potato) in the final quarter of the plate. To this you can add a small piece of fruit and 1 cup of milk or yogurt, depending on how much carbohydrate you are supposed to eat at a meal.  · Talk to your dietitian or diabetes educator about ways to add limited amounts of sweets into your meal plan. You can eat these foods now and then, as long as you include the amount of carbohydrate they have in your daily carbohydrate allowance. · If you drink alcohol, limit it to no more than 1 drink a day for women and 2 drinks a day for men. If you are pregnant, no amount of alcohol is known to be safe. · Protein, fat, and fiber do not raise blood sugar as much as carbohydrate does. If you eat a lot of these nutrients in a meal, your blood sugar will rise more slowly than it would otherwise. · Limit saturated fats, such as those from meat and dairy products. Try to replace it with monounsaturated fat, such as olive oil. This is a healthier choice because people who have diabetes are at higher-than-average risk of heart disease. But use a modest amount of olive oil.  A tablespoon of olive oil has 14 grams of fat and 120 calories. · Exercise lowers blood sugar. If you take insulin by shots or pump, you can use less than you would if you were not exercising. Keep in mind that timing matters. If you exercise within 1 hour after a meal, your body may need less insulin for that meal than it would if you exercised 3 hours after the meal. Test your blood sugar to find out how exercise affects your need for insulin. · Exercise on most days of the week. Aim for at least 30 minutes. Exercise helps you stay at a healthy weight and helps your body use insulin. Walking is an easy way to get exercise. Gradually increase the amount you walk every day. You also may want to swim, bike, or do other activities. When you eat out  · Learn to estimate the serving sizes of foods that have carbohydrate. If you measure food at home, it will be easier to estimate the amount in a serving of restaurant food. · If the meal you order has too much carbohydrate (such as potatoes, corn, or baked beans), ask to have a low-carbohydrate food instead. Ask for a salad or green vegetables. · If you use insulin, check your blood sugar before and after eating out to help you plan how much to eat in the future. · If you eat more carbohydrate at a meal than you had planned, take a walk or do other exercise. This will help lower your blood sugar. Where can you learn more? Go to [employer]  Enter N198 in the search box to learn more about \"Nutrition Tips for Diabetes: After Your Visit. \"   © 0016-0674 [employer]. Care instructions adapted under license by [employer] (which disclaims liability or warranty for this information).  This care instruction is for use with your licensed healthcare professional. If you have questions about a medical condition or this instruction, always ask your healthcare professional. [place] [address] any warranty or liability for your use of this information.   Content Version: 16.6.944941; Current as of: June 4, 2014

## 2017-08-22 LAB — T3 SERPL-MCNC: 71 NG/DL (ref 71–180)

## 2017-08-23 LAB
ATRIAL RATE: 116 BPM
CALCULATED P AXIS, ECG09: 55 DEGREES
CALCULATED R AXIS, ECG10: 86 DEGREES
CALCULATED T AXIS, ECG11: 0 DEGREES
DIAGNOSIS, 93000: NORMAL
P-R INTERVAL, ECG05: 128 MS
Q-T INTERVAL, ECG07: 316 MS
QRS DURATION, ECG06: 88 MS
QTC CALCULATION (BEZET), ECG08: 439 MS
VENTRICULAR RATE, ECG03: 116 BPM

## 2017-08-25 LAB
BACTERIA SPEC CULT: NORMAL
SERVICE CMNT-IMP: NORMAL

## 2019-04-16 ENCOUNTER — HOSPITAL ENCOUNTER (EMERGENCY)
Age: 29
Discharge: HOME | End: 2019-04-16
Attending: EMERGENCY MEDICINE
Payer: COMMERCIAL

## 2019-04-16 VITALS
HEART RATE: 104 BPM | RESPIRATION RATE: 14 BRPM | TEMPERATURE: 98.1 F | HEIGHT: 69 IN | WEIGHT: 175 LBS | BODY MASS INDEX: 25.92 KG/M2 | DIASTOLIC BLOOD PRESSURE: 53 MMHG | OXYGEN SATURATION: 98 % | SYSTOLIC BLOOD PRESSURE: 117 MMHG

## 2019-04-16 DIAGNOSIS — N17.9 AKI (ACUTE KIDNEY INJURY) (HCC): ICD-10-CM

## 2019-04-16 DIAGNOSIS — E10.10 DIABETIC KETOACIDOSIS WITHOUT COMA ASSOCIATED WITH TYPE 1 DIABETES MELLITUS (HCC): Primary | ICD-10-CM

## 2019-04-16 LAB
ALBUMIN SERPL-MCNC: 4.7 G/DL (ref 3.4–5)
ALBUMIN/GLOB SERPL: 1.5 {RATIO} (ref 0.8–1.7)
ALP SERPL-CCNC: 101 U/L (ref 45–117)
ALT SERPL-CCNC: 24 U/L (ref 16–61)
ANION GAP SERPL CALC-SCNC: 16 MMOL/L (ref 3–18)
APPEARANCE UR: CLEAR
ARTERIAL PATENCY WRIST A: ABNORMAL
AST SERPL-CCNC: 14 U/L (ref 15–37)
BASE DEFICIT BLDV-SCNC: 6 MMOL/L
BASOPHILS # BLD: 0 K/UL (ref 0–0.1)
BASOPHILS NFR BLD: 0 % (ref 0–2)
BDY SITE: ABNORMAL
BILIRUB SERPL-MCNC: 3.5 MG/DL (ref 0.2–1)
BILIRUB UR QL: NEGATIVE
BODY TEMPERATURE: 98.3
BUN SERPL-MCNC: 27 MG/DL (ref 7–18)
BUN/CREAT SERPL: 20 (ref 12–20)
CALCIUM SERPL-MCNC: 10.4 MG/DL (ref 8.5–10.1)
CHLORIDE SERPL-SCNC: 97 MMOL/L (ref 100–108)
CO2 SERPL-SCNC: 19 MMOL/L (ref 21–32)
COLOR UR: YELLOW
CREAT SERPL-MCNC: 1.35 MG/DL (ref 0.6–1.3)
DIFFERENTIAL METHOD BLD: ABNORMAL
EOSINOPHIL # BLD: 0 K/UL (ref 0–0.4)
EOSINOPHIL NFR BLD: 0 % (ref 0–5)
ERYTHROCYTE [DISTWIDTH] IN BLOOD BY AUTOMATED COUNT: 11.8 % (ref 11.6–14.5)
EST. AVERAGE GLUCOSE BLD GHB EST-MCNC: 206 MG/DL
GAS FLOW.O2 O2 DELIVERY SYS: ABNORMAL L/MIN
GLOBULIN SER CALC-MCNC: 3.1 G/DL (ref 2–4)
GLUCOSE BLD STRIP.AUTO-MCNC: 330 MG/DL (ref 70–110)
GLUCOSE BLD STRIP.AUTO-MCNC: 453 MG/DL (ref 70–110)
GLUCOSE SERPL-MCNC: 443 MG/DL (ref 74–99)
GLUCOSE UR STRIP.AUTO-MCNC: >1000 MG/DL
HBA1C MFR BLD: 8.8 % (ref 4.2–5.6)
HCO3 BLDV-SCNC: 20.2 MMOL/L (ref 23–28)
HCT VFR BLD AUTO: 46.2 % (ref 36–48)
HGB BLD-MCNC: 16 G/DL (ref 13–16)
HGB UR QL STRIP: NEGATIVE
KETONES UR QL STRIP.AUTO: >160 MG/DL
LEUKOCYTE ESTERASE UR QL STRIP.AUTO: NEGATIVE
LYMPHOCYTES # BLD: 1.5 K/UL (ref 0.9–3.6)
LYMPHOCYTES NFR BLD: 14 % (ref 21–52)
MAGNESIUM SERPL-MCNC: 2.2 MG/DL (ref 1.6–2.6)
MCH RBC QN AUTO: 30.8 PG (ref 24–34)
MCHC RBC AUTO-ENTMCNC: 34.6 G/DL (ref 31–37)
MCV RBC AUTO: 89 FL (ref 74–97)
MONOCYTES # BLD: 0.8 K/UL (ref 0.05–1.2)
MONOCYTES NFR BLD: 8 % (ref 3–10)
NEUTS SEG # BLD: 8 K/UL (ref 1.8–8)
NEUTS SEG NFR BLD: 78 % (ref 40–73)
NITRITE UR QL STRIP.AUTO: NEGATIVE
PCO2 BLDV: 40.7 MMHG (ref 41–51)
PH BLDV: 7.3 [PH] (ref 7.32–7.42)
PH UR STRIP: 5 [PH] (ref 5–8)
PHOSPHATE SERPL-MCNC: 4.7 MG/DL (ref 2.5–4.9)
PLATELET # BLD AUTO: 294 K/UL (ref 135–420)
PMV BLD AUTO: 11.7 FL (ref 9.2–11.8)
PO2 BLDV: 43 MMHG (ref 25–40)
POTASSIUM SERPL-SCNC: 4.5 MMOL/L (ref 3.5–5.5)
PROT SERPL-MCNC: 7.8 G/DL (ref 6.4–8.2)
PROT UR STRIP-MCNC: NEGATIVE MG/DL
RBC # BLD AUTO: 5.19 M/UL (ref 4.7–5.5)
SAO2 % BLDV: 75 % (ref 65–88)
SERVICE CMNT-IMP: ABNORMAL
SODIUM SERPL-SCNC: 132 MMOL/L (ref 136–145)
SP GR UR REFRACTOMETRY: >1.03 (ref 1–1.03)
SPECIMEN TYPE: ABNORMAL
UROBILINOGEN UR QL STRIP.AUTO: 0.2 EU/DL (ref 0.2–1)
WBC # BLD AUTO: 10.3 K/UL (ref 4.6–13.2)

## 2019-04-16 PROCEDURE — 96375 TX/PRO/DX INJ NEW DRUG ADDON: CPT

## 2019-04-16 PROCEDURE — 74011636637 HC RX REV CODE- 636/637: Performed by: EMERGENCY MEDICINE

## 2019-04-16 PROCEDURE — 96374 THER/PROPH/DIAG INJ IV PUSH: CPT

## 2019-04-16 PROCEDURE — 85025 COMPLETE CBC W/AUTO DIFF WBC: CPT

## 2019-04-16 PROCEDURE — 82962 GLUCOSE BLOOD TEST: CPT

## 2019-04-16 PROCEDURE — 81003 URINALYSIS AUTO W/O SCOPE: CPT

## 2019-04-16 PROCEDURE — 84100 ASSAY OF PHOSPHORUS: CPT

## 2019-04-16 PROCEDURE — 82803 BLOOD GASES ANY COMBINATION: CPT

## 2019-04-16 PROCEDURE — 96361 HYDRATE IV INFUSION ADD-ON: CPT

## 2019-04-16 PROCEDURE — 80053 COMPREHEN METABOLIC PANEL: CPT

## 2019-04-16 PROCEDURE — 99285 EMERGENCY DEPT VISIT HI MDM: CPT

## 2019-04-16 PROCEDURE — 74011250636 HC RX REV CODE- 250/636: Performed by: EMERGENCY MEDICINE

## 2019-04-16 PROCEDURE — 83735 ASSAY OF MAGNESIUM: CPT

## 2019-04-16 PROCEDURE — 83036 HEMOGLOBIN GLYCOSYLATED A1C: CPT

## 2019-04-16 RX ORDER — MAGNESIUM SULFATE 100 %
4 CRYSTALS MISCELLANEOUS AS NEEDED
Status: DISCONTINUED | OUTPATIENT
Start: 2019-04-16 | End: 2019-04-17 | Stop reason: HOSPADM

## 2019-04-16 RX ORDER — SODIUM CHLORIDE 0.9 % (FLUSH) 0.9 %
5-40 SYRINGE (ML) INJECTION AS NEEDED
Status: DISCONTINUED | OUTPATIENT
Start: 2019-04-16 | End: 2019-04-17 | Stop reason: HOSPADM

## 2019-04-16 RX ORDER — SODIUM CHLORIDE 0.9 % (FLUSH) 0.9 %
5-40 SYRINGE (ML) INJECTION EVERY 8 HOURS
Status: DISCONTINUED | OUTPATIENT
Start: 2019-04-16 | End: 2019-04-17 | Stop reason: HOSPADM

## 2019-04-16 RX ORDER — ONDANSETRON 2 MG/ML
4 INJECTION INTRAMUSCULAR; INTRAVENOUS
Status: COMPLETED | OUTPATIENT
Start: 2019-04-16 | End: 2019-04-16

## 2019-04-16 RX ORDER — DEXTROSE 50 % IN WATER (D50W) INTRAVENOUS SYRINGE
25-50 AS NEEDED
Status: DISCONTINUED | OUTPATIENT
Start: 2019-04-16 | End: 2019-04-17 | Stop reason: HOSPADM

## 2019-04-16 RX ADMIN — ONDANSETRON 4 MG: 2 INJECTION INTRAMUSCULAR; INTRAVENOUS at 20:18

## 2019-04-16 RX ADMIN — SODIUM CHLORIDE 1000 ML: 900 INJECTION, SOLUTION INTRAVENOUS at 20:17

## 2019-04-16 RX ADMIN — HUMAN INSULIN 10 UNITS: 100 INJECTION, SOLUTION SUBCUTANEOUS at 20:16

## 2019-04-16 NOTE — ED TRIAGE NOTES
Triage: pt with increased BGL today. Insulin pump in place. Pt complains of nausea, fatigue. Pt on ketotic diet x 2 months. Checking ketones with urine strips. States that he was + for ketones today.  in triage

## 2019-04-16 NOTE — ED PROVIDER NOTES
EMERGENCY DEPARTMENT HISTORY AND PHYSICAL EXAM 
 
Date: 4/16/2019 Patient Name: Antoine Gallagher History of Presenting Illness Chief Complaint Patient presents with  
 High Blood Sugar HPI:  
7:55 PM 
Antoine Gallagher is a 33 y.o. male with PMHX of IDDM and has a pump who presents to the emergency department C/O high blood sugar. Patient states he vomited about 10 AM today and checked his blood sugar and it was 500. He again vomited about 2 PM and again checked his blood sugar and had only come down to 440. He then decided to come to the ED. He states his pump is working well. . Associated sxs include nausea. Pt denies chest pain or shortness of breath, abdomen pain, fever, and any other sxs or complaints. PCP: Agustina Linton MD 
 
Current Facility-Administered Medications Medication Dose Route Frequency Provider Last Rate Last Dose  sodium chloride (NS) flush 5-40 mL  5-40 mL IntraVENous Q8H Lg Gramajo MD      
 sodium chloride (NS) flush 5-40 mL  5-40 mL IntraVENous PRN Rekha Gramajo MD      
 insulin regular (NOVOLIN R, HUMULIN R) 100 Units in 0.9% sodium chloride 100 mL infusion  1-10 Units/hr IntraVENous TITRATE Lg Gramajo MD      
 glucose chewable tablet 16 g  4 Tab Oral PRN Lg Gramajo MD      
 glucagon (GLUCAGEN) injection 1 mg  1 mg IntraMUSCular PRN Lg Gramajo MD      
 dextrose (D50W) injection syrg 12.5-25 g  25-50 mL IntraVENous PRN Rekha Gramajo MD      
 
Current Outpatient Medications Medication Sig Dispense Refill  insulin lispro (HUMALOG) 100 unit/mL cartridge by SubCUTAneous route. Via insulin pump  buPROPion (WELLBUTRIN) 100 mg tablet Take  by mouth two (2) times a day.  enalapril (VASOTEC) 10 mg tablet Take  by mouth daily. Past History Past Medical History: 
Past Medical History:  
Diagnosis Date  Diabetes ([address] Past Surgical History: History reviewed. No pertinent surgical history. Family History: 
History reviewed. No pertinent family history. Social History: 
Social History Tobacco Use  Smoking status: Never Smoker Substance Use Topics  Alcohol use: No  
 Drug use: Not on file Allergies: 
No Known Allergies Review of Systems Review of Systems Constitutional: Positive for appetite change. Negative for activity change, chills and fever. HENT: Negative for congestion and sore throat. Respiratory: Negative for cough and shortness of breath. Cardiovascular: Negative for chest pain and palpitations. Gastrointestinal: Positive for nausea and vomiting. Negative for abdominal pain. Endocrine: Negative for cold intolerance, heat intolerance, polydipsia, polyphagia and polyuria. Genitourinary: Negative for dysuria, flank pain and frequency. Musculoskeletal: Negative for arthralgias, joint swelling and myalgias. Skin: Negative for color change and wound. Neurological: Negative for dizziness, weakness, light-headedness and headaches. Hematological: Negative for adenopathy. Physical Exam  
 
Vitals:  
 04/16/19 1941 04/16/19 2015 04/16/19 2045 04/16/19 2115 BP: 131/77 126/58 110/53 117/53 Pulse: (!) 118 (!) 104 (!) 105 (!) 104 Resp: 22 16 15 14 Temp: 98.1 °F (36.7 °C) SpO2: 100% 97% 97% 98% Weight: 79.4 kg (175 lb) Height: 5' 9\" (1.753 m) Physical Exam  
Constitutional: He is oriented to person, place, and time. He appears well-developed and well-nourished. No distress. Patient appears in no acute distress. HENT:  
Head: Normocephalic and atraumatic. Head is without right periorbital erythema and without left periorbital erythema. Right Ear: External ear normal. No drainage or swelling. Tympanic membrane is not perforated, not erythematous and not bulging. Left Ear: External ear normal. No drainage or swelling.  Tympanic membrane is not perforated, not erythematous and not bulging. Nose: Nose normal. No mucosal edema or rhinorrhea. Right sinus exhibits no maxillary sinus tenderness and no frontal sinus tenderness. Left sinus exhibits no maxillary sinus tenderness and no frontal sinus tenderness. Mouth/Throat: Uvula is midline, oropharynx is clear and moist and mucous membranes are normal. No oral lesions. No trismus in the jaw. No dental abscesses or uvula swelling. No oropharyngeal exudate, posterior oropharyngeal edema, posterior oropharyngeal erythema or tonsillar abscesses. Eyes: Conjunctivae are normal. Right eye exhibits no discharge. Left eye exhibits no discharge. No scleral icterus. Neck: Normal range of motion. Neck supple. Cardiovascular: Normal rate, regular rhythm, normal heart sounds and intact distal pulses. Exam reveals no gallop and no friction rub. No murmur heard. Pulmonary/Chest: Effort normal and breath sounds normal. No accessory muscle usage. No tachypnea. No respiratory distress. He has no decreased breath sounds. He has no wheezes. He has no rhonchi. He has no rales. Abdominal: Soft. Bowel sounds are normal. He exhibits no distension. There is no tenderness. Musculoskeletal: Normal range of motion. He exhibits no edema or tenderness. Lymphadenopathy:  
  He has no cervical adenopathy. Neurological: He is alert and oriented to person, place, and time. Skin: Skin is warm and dry. He is not diaphoretic. Psychiatric: He has a normal mood and affect. Judgment normal.  
Nursing note and vitals reviewed. Diagnostic Study Results Labs - RESULTS: 
 
No orders to display Labs Reviewed METABOLIC PANEL, COMPREHENSIVE - Abnormal; Notable for the following components:  
    Result Value Sodium 132 (*) Chloride 97 (*)   
 CO2 19 (*) Glucose 443 (*) BUN 27 (*) Creatinine 1.35 (*) Calcium 10.4 (*) Bilirubin, total 3.5 (*) AST (SGOT) 14 (*) All other components within normal limits CBC WITH AUTOMATED DIFF - Abnormal; Notable for the following components:  
 NEUTROPHILS 78 (*) LYMPHOCYTES 14 (*) All other components within normal limits URINALYSIS W/ RFLX MICROSCOPIC - Abnormal; Notable for the following components:  
 Specific gravity >1.030 (*) Glucose >1,000 (*) Ketone >160 (*) All other components within normal limits HEMOGLOBIN A1C WITH EAG - Abnormal; Notable for the following components:  
 Hemoglobin A1c 8.8 (*) All other components within normal limits GLUCOSE, POC - Abnormal; Notable for the following components:  
 Glucose (POC) 453 (*) All other components within normal limits POC VENOUS BLOOD GAS - Abnormal; Notable for the following components:  
 pH, venous (POC) 7.303 (*)   
 pCO2, venous (POC) 40.7 (*)   
 pO2, venous (POC) 43 (*) HCO3, venous (POC) 20.2 (*) All other components within normal limits GLUCOSE, POC - Abnormal; Notable for the following components:  
 Glucose (POC) 330 (*) All other components within normal limits MAGNESIUM  
PHOSPHORUS  
POC VENOUS BLOOD GAS Recent Results (from the past 12 hour(s)) GLUCOSE, POC Collection Time: 04/16/19  7:46 PM  
Result Value Ref Range Glucose (POC) 453 (HH) 70 - 110 mg/dL METABOLIC PANEL, COMPREHENSIVE Collection Time: 04/16/19  8:00 PM  
Result Value Ref Range Sodium 132 (L) 136 - 145 mmol/L Potassium 4.5 3.5 - 5.5 mmol/L Chloride 97 (L) 100 - 108 mmol/L  
 CO2 19 (L) 21 - 32 mmol/L Anion gap 16 3.0 - 18 mmol/L Glucose 443 (HH) 74 - 99 mg/dL BUN 27 (H) 7.0 - 18 MG/DL Creatinine 1.35 (H) 0.6 - 1.3 MG/DL  
 BUN/Creatinine ratio 20 12 - 20 GFR est AA >60 >60 ml/min/1.73m2 GFR est non-AA >60 >60 ml/min/1.73m2 Calcium 10.4 (H) 8.5 - 10.1 MG/DL Bilirubin, total 3.5 (H) 0.2 - 1.0 MG/DL  
 ALT (SGPT) 24 16 - 61 U/L  
 AST (SGOT) 14 (L) 15 - 37 U/L Alk.  phosphatase 101 45 - 117 U/L  
 Protein, total 7.8 6.4 - 8.2 g/dL Albumin 4.7 3.4 - 5.0 g/dL Globulin 3.1 2.0 - 4.0 g/dL A-G Ratio 1.5 0.8 - 1.7    
CBC WITH AUTOMATED DIFF Collection Time: 04/16/19  8:00 PM  
Result Value Ref Range WBC 10.3 4.6 - 13.2 K/uL  
 RBC 5.19 4.70 - 5.50 M/uL  
 HGB 16.0 13.0 - 16.0 g/dL HCT 46.2 36.0 - 48.0 % MCV 89.0 74.0 - 97.0 FL  
 MCH 30.8 24.0 - 34.0 PG  
 MCHC 34.6 31.0 - 37.0 g/dL  
 RDW 11.8 11.6 - 14.5 % PLATELET 903 196 - 687 K/uL MPV 11.7 9.2 - 11.8 FL  
 NEUTROPHILS 78 (H) 40 - 73 % LYMPHOCYTES 14 (L) 21 - 52 % MONOCYTES 8 3 - 10 % EOSINOPHILS 0 0 - 5 % BASOPHILS 0 0 - 2 %  
 ABS. NEUTROPHILS 8.0 1.8 - 8.0 K/UL  
 ABS. LYMPHOCYTES 1.5 0.9 - 3.6 K/UL  
 ABS. MONOCYTES 0.8 0.05 - 1.2 K/UL  
 ABS. EOSINOPHILS 0.0 0.0 - 0.4 K/UL  
 ABS. BASOPHILS 0.0 0.0 - 0.1 K/UL  
 DF AUTOMATED URINALYSIS W/ RFLX MICROSCOPIC Collection Time: 04/16/19  8:00 PM  
Result Value Ref Range Color YELLOW Appearance CLEAR Specific gravity >1.030 (H) 1.005 - 1.030  
 pH (UA) 5.0 5.0 - 8.0 Protein NEGATIVE  NEG mg/dL Glucose >1,000 (A) NEG mg/dL Ketone >160 (A) NEG mg/dL Bilirubin NEGATIVE  NEG Blood NEGATIVE  NEG Urobilinogen 0.2 0.2 - 1.0 EU/dL Nitrites NEGATIVE  NEG Leukocyte Esterase NEGATIVE  NEG MAGNESIUM Collection Time: 04/16/19  8:00 PM  
Result Value Ref Range Magnesium 2.2 1.6 - 2.6 mg/dL PHOSPHORUS Collection Time: 04/16/19  8:00 PM  
Result Value Ref Range Phosphorus 4.7 2.5 - 4.9 MG/DL  
HEMOGLOBIN A1C WITH EAG Collection Time: 04/16/19  8:00 PM  
Result Value Ref Range Hemoglobin A1c 8.8 (H) 4.2 - 5.6 % Est. average glucose 206 mg/dL POC VENOUS BLOOD GAS Collection Time: 04/16/19  9:21 PM  
Result Value Ref Range Device: ROOM AIR    
 pH, venous (POC) 7.303 (L) 7.32 - 7.42    
 pCO2, venous (POC) 40.7 (L) 41 - 51 MMHG  
 pO2, venous (POC) 43 (H) 25 - 40 mmHg HCO3, venous (POC) 20.2 (L) 23.0 - 28.0 MMOL/L  
 sO2, venous (POC) 75 65 - 88 % Base deficit, venous (POC) 6 mmol/L Allens test (POC) N/A Site RIGHT BRACHIAL Patient temp. 98.3 Specimen type (POC) VENOUS BLOOD Performed by Demond Valdivia GLUCOSE, POC Collection Time: 04/16/19  9:46 PM  
Result Value Ref Range Glucose (POC) 330 (H) 70 - 110 mg/dL Recent Results (from the past 12 hour(s)) GLUCOSE, POC Collection Time: 04/16/19  7:46 PM  
Result Value Ref Range Glucose (POC) 453 (HH) 70 - 110 mg/dL METABOLIC PANEL, COMPREHENSIVE Collection Time: 04/16/19  8:00 PM  
Result Value Ref Range Sodium 132 (L) 136 - 145 mmol/L Potassium 4.5 3.5 - 5.5 mmol/L Chloride 97 (L) 100 - 108 mmol/L  
 CO2 19 (L) 21 - 32 mmol/L Anion gap 16 3.0 - 18 mmol/L Glucose 443 (HH) 74 - 99 mg/dL BUN 27 (H) 7.0 - 18 MG/DL Creatinine 1.35 (H) 0.6 - 1.3 MG/DL  
 BUN/Creatinine ratio 20 12 - 20 GFR est AA >60 >60 ml/min/1.73m2 GFR est non-AA >60 >60 ml/min/1.73m2 Calcium 10.4 (H) 8.5 - 10.1 MG/DL Bilirubin, total 3.5 (H) 0.2 - 1.0 MG/DL  
 ALT (SGPT) 24 16 - 61 U/L  
 AST (SGOT) 14 (L) 15 - 37 U/L Alk. phosphatase 101 45 - 117 U/L Protein, total 7.8 6.4 - 8.2 g/dL Albumin 4.7 3.4 - 5.0 g/dL Globulin 3.1 2.0 - 4.0 g/dL A-G Ratio 1.5 0.8 - 1.7    
CBC WITH AUTOMATED DIFF Collection Time: 04/16/19  8:00 PM  
Result Value Ref Range WBC 10.3 4.6 - 13.2 K/uL  
 RBC 5.19 4.70 - 5.50 M/uL  
 HGB 16.0 13.0 - 16.0 g/dL HCT 46.2 36.0 - 48.0 % MCV 89.0 74.0 - 97.0 FL  
 MCH 30.8 24.0 - 34.0 PG  
 MCHC 34.6 31.0 - 37.0 g/dL  
 RDW 11.8 11.6 - 14.5 % PLATELET 636 007 - 498 K/uL MPV 11.7 9.2 - 11.8 FL  
 NEUTROPHILS 78 (H) 40 - 73 % LYMPHOCYTES 14 (L) 21 - 52 % MONOCYTES 8 3 - 10 % EOSINOPHILS 0 0 - 5 % BASOPHILS 0 0 - 2 %  
 ABS. NEUTROPHILS 8.0 1.8 - 8.0 K/UL  
 ABS. LYMPHOCYTES 1.5 0.9 - 3.6 K/UL ABS. MONOCYTES 0.8 0.05 - 1.2 K/UL  
 ABS. EOSINOPHILS 0.0 0.0 - 0.4 K/UL  
 ABS. BASOPHILS 0.0 0.0 - 0.1 K/UL  
 DF AUTOMATED URINALYSIS W/ RFLX MICROSCOPIC Collection Time: 04/16/19  8:00 PM  
Result Value Ref Range Color YELLOW Appearance CLEAR Specific gravity >1.030 (H) 1.005 - 1.030  
 pH (UA) 5.0 5.0 - 8.0 Protein NEGATIVE  NEG mg/dL Glucose >1,000 (A) NEG mg/dL Ketone >160 (A) NEG mg/dL Bilirubin NEGATIVE  NEG Blood NEGATIVE  NEG Urobilinogen 0.2 0.2 - 1.0 EU/dL Nitrites NEGATIVE  NEG Leukocyte Esterase NEGATIVE  NEG MAGNESIUM Collection Time: 04/16/19  8:00 PM  
Result Value Ref Range Magnesium 2.2 1.6 - 2.6 mg/dL PHOSPHORUS Collection Time: 04/16/19  8:00 PM  
Result Value Ref Range Phosphorus 4.7 2.5 - 4.9 MG/DL  
HEMOGLOBIN A1C WITH EAG Collection Time: 04/16/19  8:00 PM  
Result Value Ref Range Hemoglobin A1c 8.8 (H) 4.2 - 5.6 % Est. average glucose 206 mg/dL POC VENOUS BLOOD GAS Collection Time: 04/16/19  9:21 PM  
Result Value Ref Range Device: ROOM AIR    
 pH, venous (POC) 7.303 (L) 7.32 - 7.42    
 pCO2, venous (POC) 40.7 (L) 41 - 51 MMHG  
 pO2, venous (POC) 43 (H) 25 - 40 mmHg HCO3, venous (POC) 20.2 (L) 23.0 - 28.0 MMOL/L  
 sO2, venous (POC) 75 65 - 88 % Base deficit, venous (POC) 6 mmol/L Allens test (POC) N/A Site RIGHT BRACHIAL Patient temp. 98.3 Specimen type (POC) VENOUS BLOOD Performed by Arianna North GLUCOSE, POC Collection Time: 04/16/19  9:46 PM  
Result Value Ref Range Glucose (POC) 330 (H) 70 - 110 mg/dL Medications given in the ED- Medications  
sodium chloride (NS) flush 5-40 mL (has no administration in time range)  
sodium chloride (NS) flush 5-40 mL (has no administration in time range)  
insulin regular (NOVOLIN R, HUMULIN R) 100 Units in 0.9% sodium chloride 100 mL infusion (has no administration in time range) glucose chewable tablet 16 g (has no administration in time range) glucagon (GLUCAGEN) injection 1 mg (has no administration in time range) dextrose (D50W) injection syrg 12.5-25 g (has no administration in time range)  
sodium chloride 0.9 % bolus infusion 1,000 mL (0 mL IntraVENous IV Completed 4/16/19 2251) insulin regular (NOVOLIN R, HUMULIN R) injection 10 Units (10 Units IntraVENous Given 4/16/19 2016)  
ondansetron (ZOFRAN) injection 4 mg (4 mg IntraVENous Given 4/16/19 2018) Medications  
sodium chloride (NS) flush 5-40 mL (has no administration in time range)  
sodium chloride (NS) flush 5-40 mL (has no administration in time range)  
insulin regular (NOVOLIN R, HUMULIN R) 100 Units in 0.9% sodium chloride 100 mL infusion (has no administration in time range) glucose chewable tablet 16 g (has no administration in time range) glucagon (GLUCAGEN) injection 1 mg (has no administration in time range) dextrose (D50W) injection syrg 12.5-25 g (has no administration in time range)  
sodium chloride 0.9 % bolus infusion 1,000 mL (0 mL IntraVENous IV Completed 4/16/19 2251) insulin regular (NOVOLIN R, HUMULIN R) injection 10 Units (10 Units IntraVENous Given 4/16/19 2016)  
ondansetron (ZOFRAN) injection 4 mg (4 mg IntraVENous Given 4/16/19 2018) Medical Decision Making I am the first provider for this patient. I reviewed the vital signs, available nursing notes, past medical history, past surgical history, family history and social history. Vital Signs-Reviewed the patient's vital signs. Pulse Oximetry Analysis - 98% on RA Cardiac Monitor: 
Rate: 104 bpm 
Rhythm: sinus Records Reviewed: Nursing Notes and Old Medical Records Provider Notes (Medical Decision Making): The patient presents with high blood sugar. Need to rule out DKA. Also need to rule out pneumonia UTI, electrolyte abnormalities.  
 
POC in the ED was 443, electrolytes showed bicarb of 19 and patient required venous pH which was 7.30. Glucose stabilizer was established. Was in process of discussing with hospitalist for admission to manage DKA and patient decided he wanted to go home. He stated he has dealt with this many times and he is prepared to take care of himself at home. Mother was at bedside. Patient made aware of risks of worsening DKA and should he be unable to care for himself to return the ED. I informed the pt that He needed admission, further lab evaluation for adequate evaluation for his DKA, symptoms, that by refusing admission, observation, workup He is at risk for worsening symptoms. He is awake, alert, He understands his condition and the risks involved in leaving. He is clinically aware of his surroundings and able to ask appropriate questions, the patients family member mother and the nurse present confirms He is not clinically intoxicated and able to make medical decisions. He verbalized knowing the same risks and understood it was recommended that He stay and could also return at any time. his family member mother was present for the discussion and also verbalized that He understood both diagnosis, risks and could return at any time. They were both provided with warnings regarding worsening of his condition and were provided instructions to follow up with Madan Alcaraz MD tomorrow or return to the Emergency Room as soon as possible. This discussion was witnessed by Ricco Ramirez RN. Procedures: 
Procedures ED Course:  
7:55 PM Initial assessment performed. The patients presenting problems have been discussed, and they are in agreement with the care plan formulated and outlined with them. I have encouraged them to ask questions as they arise throughout their visit. Diagnosis and Disposition DISCHARGE NOTE: 
10:52 PM 
 
Nataliia Salguero  results have been reviewed with him. He has been counseled regarding his diagnosis, treatment, and plan.   He verbally conveys understanding and agreement of the signs, symptoms, diagnosis, treatment and prognosis and additionally agrees to follow up as discussed. He also agrees with the care-plan and conveys that all of his questions have been answered. I have also provided discharge instructions for him that include: educational information regarding their diagnosis and treatment, and list of reasons why they would want to return to the ED prior to their follow-up appointment, should his condition change. He has been provided with education for proper emergency department utilization. CLINICAL IMPRESSION: 
 
1. Diabetic ketoacidosis without coma associated with type 1 diabetes mellitus ([facility] [address] 2. IDDM (insulin dependent diabetes mellitus) ([facility] [address] 3. RENETTA (acute kidney injury) ([facility] [address] PLAN: 
1. D/C Home 2. Current Discharge Medication List  
  
 
3. Follow-up Information Follow up With Specialties Details Why Contact Info Eulis Curling, MD Internal Medicine Call today  [address] [address] 
[phone] [facility] EMERGENCY DEPT Emergency Medicine  As needed [address] 
[address] [address] 
[phone]

## 2019-04-17 NOTE — ED NOTES
I have reviewed discharge instructions with the patient. The patient verbalized understanding. I have reviewed the provider's instructions with the patient, answering all questions to his satisfaction. Patient armband removed and shredded.

## 2019-04-17 NOTE — ED NOTES
Pt in ED on stretcher in gown on monitor with c/o elevated BG, pt reports one episode of vomiting before arrival to ED pt denies n/d at this time, pt denies chest pain, back pain or headache.

## 2021-08-23 NOTE — H&P
HISTORY AND PHYSICAL EXAMINATION      Assessment:     Patient Active Problem List    Diagnosis Date Noted    DKA (diabetic ketoacidoses) ([facility] 08/19/2017    Acute respiratory failure with hypoxia ([facility] 02/07/2015    Dehydration 02/07/2015    Pneumonia 02/04/2015    Hypoxia 02/04/2015    Influenza 02/04/2015    DKA (diabetic ketoacidosis) ([facility] 02/02/2015     1) DKA  2) URI  3) Acute renal insuf - moslty pre renal / dehydration  4) Acute hyperkalemia  5) N/V    Plan:   Admit to ICU  DKA protocol  IV abx  Labs as ordered    GI/DVT Prophylaxis sq hep    Code Status: full    Subjective:     Baldemar Bhatia is a 31 y.o. male being admitted to the hospital with DKA. Pt has been dx with strep throat last week and has been on abx. He has been having generlized weakness with fatigue and nausea. In ER, he has been dx wiith DKA along with dehydration. .         Past Medical History:   Diagnosis Date    Diabetes ([facility]        History reviewed. No pertinent surgical history. No Known Allergies    Current Facility-Administered Medications   Medication Dose Route Frequency Provider Last Rate Last Dose    insulin regular (NOVOLIN R, HUMULIN R) 100 Units in 0.9% sodium chloride 100 mL infusion  1-10 Units/hr IntraVENous TITRATE RYAN Ackerman 12.9 mL/hr at 08/19/17 1949 12.9 Units/hr at 08/19/17 1949    glucose chewable tablet 16 g  4 Tab Oral PRN RYAN Ackerman        glucagon (GLUCAGEN) injection 1 mg  1 mg IntraMUSCular PRN RYAN Ackerman        dextrose (D50W) injection syrg 12.5-25 g  25-50 mL IntraVENous PRN RYAN Ackerman        promethazine (PHENERGAN) 25 mg in 0.9% sodium chloride 50 mL IVPB  25 mg IntraVENous NOW RYAN Ackerman 200 mL/hr at 08/19/17 2018 25 mg at 08/19/17 2018     Current Outpatient Prescriptions   Medication Sig Dispense Refill    insulin lispro (HUMALOG) 100 unit/mL cartridge by SubCUTAneous route.  Via insulin pump      buPROPion (WELLBUTRIN) 100 mg tablet Health Maintenance Due   Topic Date Due   • Meningococcal Vaccine (1 - 2-dose series) Never done   • DTaP/Tdap/Td Vaccine (6 - Tdap) 08/08/2021   • HPV Vaccine (1 - Male 2-dose series) Never done       Patient is due for topics as listed above but is not proceeding with Immunization(s) Dtap/Tdap/Td, HPV and Meningococcal at this time.            Take  by mouth two (2) times a day.  enalapril (VASOTEC) 10 mg tablet Take  by mouth daily.  insulin glargine (LANTUS) 100 unit/mL injection 15 Units by SubCUTAneous route nightly. 1 Vial 11       Prior to Admission Medications   Prescriptions Last Dose Informant Patient Reported? Taking? buPROPion (WELLBUTRIN) 100 mg tablet   Yes Yes   Sig: Take  by mouth two (2) times a day. enalapril (VASOTEC) 10 mg tablet   Yes Yes   Sig: Take  by mouth daily. insulin glargine (LANTUS) 100 unit/mL injection   No No   Sig: 15 Units by SubCUTAneous route nightly. insulin lispro (HUMALOG) 100 unit/mL cartridge   Yes Yes   Sig: by SubCUTAneous route. Via insulin pump      Facility-Administered Medications: None       Social History     Social History    Marital status: SINGLE     Spouse name: N/A    Number of children: N/A    Years of education: N/A     Occupational History    Not on file. Social History Main Topics    Smoking status: Never Smoker    Smokeless tobacco: Not on file    Alcohol use No    Drug use: Not on file    Sexual activity: Not on file     Other Topics Concern    Not on file     Social History Narrative       History reviewed. No pertinent family history. Constitutional: pos for chills, fever and malaise/fatigue   Skin: negative for rash   HENT: negative for congestion, ear pain and headaches   Eyes: Negative for blurred vision   Cardiovascular: negative for chest pain, leg swelling, orthopnea   Respiratory: negative for  shortness of breath, sputum production or wheezing   Gastointestinal: Negative for abdominal pain, constipation, diarrhea, nausea and vomiting   Genitourinary: not assessed   Musculoskeletal: negative for back pain, falls and myalgias   Endo: negative for polydipsia and polyuria.    Heme: negative for anemia   Allergies: negative for environmental allergies and hay fever   Neurological: negative for dizziness and focal weakness   Psychiatric:  Negative for depression, insomnia and anxious         Objective:     Patient Vitals for the past 24 hrs:   BP Temp Pulse Resp SpO2 Height Weight   08/19/17 1915 - - (!) 115 11 100 % - -   08/19/17 1900 134/46 - (!) 118 18 100 % - -   08/19/17 1845 123/53 - (!) 119 19 100 % - -   08/19/17 1830 (!) 121/36 - (!) 114 20 99 % - -   08/19/17 1815 135/53 - (!) 108 17 100 % - -   08/19/17 1800 126/58 - (!) 106 13 100 % - -   08/19/17 1745 139/54 - (!) 107 14 99 % - -   08/19/17 1730 141/66 - (!) 111 13 100 % - -   08/19/17 1724 136/59 98 °F (36.7 °C) (!) 107 16 100 % 5' 9\" (1.753 m) 81.6 kg (180 lb)           Extended / Orthostatic Vitals:    Vital Signs  Level of Consciousness: Alert (08/19/17 1724)  Temp: 98 °F (36.7 °C) (08/19/17 1724)  Temp Source: Oral (08/19/17 1724)  Pulse (Heart Rate): (!) 115 (08/19/17 1915)  Cardiac Rhythm: Sinus tachycardia (08/19/17 1801)  Resp Rate: 11 (08/19/17 1915)  BP: 134/46 (08/19/17 1900)  MAP (Monitor): 65 (08/19/17 1900)  MAP (Calculated): 85 (08/19/17 1724)  BP 1 Location: Left arm (08/19/17 1724)  BP Patient Position: At rest (08/19/17 1724)  MEWS Score: 2 (08/19/17 1724)         Oxygen Therapy  O2 Sat (%): 100 % (08/19/17 1915)  O2 Device: Room air (08/19/17 1724)    General Appearance:   Appears in  acute distress. ,  Skin:   No rash, No jaundice,   Lymph:   There is no lymphadenopathy,   HEENT:   PERRLA, EOMI, Dry oral mucous membranes, Sclera clear, Neck:   Supple, Without masses, Without thyromegaly, Without bruits,   Lungs:   Clear,   Heart:   Regular rate and rhythm, No murmurs,   Breasts:   normal,   Abdomen:   Soft , Non-distended, Normal bowel sounds and No organomegaly or mass,   Extremities:   No edema of legs, Normal pedal and radial pulses,         Laboratory:     Recent Results (from the past 24 hour(s))   CBC WITH AUTOMATED DIFF    Collection Time: 08/19/17  5:35 PM   Result Value Ref Range    WBC 10.7 4.6 - 13.2 K/uL    RBC 5.00 4.70 - 5.50 M/uL    HGB 15.7 13.0 - 16.0 g/dL    HCT 44.6 36.0 - 48.0 %    MCV 89.2 74.0 - 97.0 FL    MCH 31.4 24.0 - 34.0 PG    MCHC 35.2 31.0 - 37.0 g/dL    RDW 11.8 11.6 - 14.5 %    PLATELET 524 407 - 122 K/uL    MPV 11.1 9.2 - 11.8 FL    NEUTROPHILS 84 (H) 40 - 73 %    LYMPHOCYTES 11 (L) 21 - 52 %    MONOCYTES 5 3 - 10 %    EOSINOPHILS 0 0 - 5 %    BASOPHILS 0 0 - 2 %    ABS. NEUTROPHILS 9.0 (H) 1.8 - 8.0 K/UL    ABS. LYMPHOCYTES 1.2 0.9 - 3.6 K/UL    ABS. MONOCYTES 0.5 0.05 - 1.2 K/UL    ABS. EOSINOPHILS 0.0 0.0 - 0.4 K/UL    ABS.  BASOPHILS 0.0 0.0 - 0.06 K/UL    RBC COMMENTS NORMOCYTIC, NORMOCHROMIC      DF AUTOMATED     METABOLIC PANEL, BASIC    Collection Time: 08/19/17  5:35 PM   Result Value Ref Range    Sodium 131 (L) 136 - 145 mmol/L    Potassium 6.5 (HH) 3.5 - 5.5 mmol/L    Chloride 91 (L) 100 - 108 mmol/L    CO2 17 (L) 21 - 32 mmol/L    Anion gap 23 (H) 3.0 - 18 mmol/L    Glucose 707 (HH) 74 - 99 mg/dL    BUN 32 (H) 7.0 - 18 MG/DL    Creatinine 2.01 (H) 0.6 - 1.3 MG/DL    BUN/Creatinine ratio 16 12 - 20      GFR est AA 49 (L) >60 ml/min/1.73m2    GFR est non-AA 40 (L) >60 ml/min/1.73m2    Calcium 9.6 8.5 - 10.1 MG/DL   MAGNESIUM    Collection Time: 08/19/17  5:35 PM   Result Value Ref Range    Magnesium 2.3 1.6 - 2.6 mg/dL   HEMOGLOBIN A1C WITH EAG    Collection Time: 08/19/17  5:35 PM   Result Value Ref Range    Hemoglobin A1c 8.8 (H) 4.5 - 5.6 %    Est. average glucose 206 mg/dL   GLUCOSTABILIZER    Collection Time: 08/19/17  7:46 PM   Result Value Ref Range    Glucose 707 mg/dL    Insulin order 12.9 units/hour    Insulin adminstered 12.9 units/hour    Multiplier 0.020     Low target 140 mg/dL    High target 180 mg/dL    D50 order 0.0 ml    D50 administered 0.00 ml    Minutes until next BG 60 min    Order initials MPV     Administered initials MPV    EKG, 12 LEAD, INITIAL    Collection Time: 08/19/17  8:00 PM   Result Value Ref Range    Ventricular Rate 116 BPM    Atrial Rate 116 BPM    P-R Interval 128 ms    QRS Duration 88 ms    Q-T Interval 316 ms QTC Calculation (Bezet) 439 ms    Calculated P Axis 55 degrees    Calculated R Axis 86 degrees    Calculated T Axis 0 degrees    Diagnosis       Sinus tachycardia  Possible Left atrial enlargement  Borderline ECG  When compared with ECG of 15-JUL-2011 17:44,  No significant change was found     GLUCOSE, POC    Collection Time: 08/19/17  8:03 PM   Result Value Ref Range    Glucose (POC) >600 (HH) 70 - 110 mg/dL         Imaging/Procedures:     Chest X-ray:  pending        Мария Ridley MD    8/19/2017, 8:18 PM